# Patient Record
Sex: MALE | Race: WHITE | NOT HISPANIC OR LATINO | URBAN - METROPOLITAN AREA
[De-identification: names, ages, dates, MRNs, and addresses within clinical notes are randomized per-mention and may not be internally consistent; named-entity substitution may affect disease eponyms.]

---

## 2019-02-16 ENCOUNTER — INPATIENT (INPATIENT)
Facility: HOSPITAL | Age: 84
LOS: 3 days | Discharge: HOME | DRG: 872 | End: 2019-02-20
Attending: INTERNAL MEDICINE | Admitting: INTERNAL MEDICINE
Payer: MEDICARE

## 2019-02-16 VITALS
HEART RATE: 95 BPM | RESPIRATION RATE: 14 BRPM | SYSTOLIC BLOOD PRESSURE: 94 MMHG | DIASTOLIC BLOOD PRESSURE: 60 MMHG | WEIGHT: 149.91 LBS | TEMPERATURE: 99 F | OXYGEN SATURATION: 97 %

## 2019-02-16 DIAGNOSIS — M10.9 GOUT, UNSPECIFIED: ICD-10-CM

## 2019-02-16 DIAGNOSIS — N39.0 URINARY TRACT INFECTION, SITE NOT SPECIFIED: ICD-10-CM

## 2019-02-16 DIAGNOSIS — M79.2 NEURALGIA AND NEURITIS, UNSPECIFIED: ICD-10-CM

## 2019-02-16 DIAGNOSIS — N17.9 ACUTE KIDNEY FAILURE, UNSPECIFIED: ICD-10-CM

## 2019-02-16 DIAGNOSIS — N40.0 BENIGN PROSTATIC HYPERPLASIA WITHOUT LOWER URINARY TRACT SYMPTOMS: ICD-10-CM

## 2019-02-16 DIAGNOSIS — R26.81 UNSTEADINESS ON FEET: ICD-10-CM

## 2019-02-16 DIAGNOSIS — Z90.49 ACQUIRED ABSENCE OF OTHER SPECIFIED PARTS OF DIGESTIVE TRACT: Chronic | ICD-10-CM

## 2019-02-16 DIAGNOSIS — I48.91 UNSPECIFIED ATRIAL FIBRILLATION: ICD-10-CM

## 2019-02-16 DIAGNOSIS — Z29.9 ENCOUNTER FOR PROPHYLACTIC MEASURES, UNSPECIFIED: ICD-10-CM

## 2019-02-16 DIAGNOSIS — A41.9 SEPSIS, UNSPECIFIED ORGANISM: ICD-10-CM

## 2019-02-16 DIAGNOSIS — I10 ESSENTIAL (PRIMARY) HYPERTENSION: ICD-10-CM

## 2019-02-16 DIAGNOSIS — R53.1 WEAKNESS: ICD-10-CM

## 2019-02-16 LAB
ALBUMIN SERPL ELPH-MCNC: 2.9 G/DL — LOW (ref 3.3–5)
ALP SERPL-CCNC: 56 U/L — SIGNIFICANT CHANGE UP (ref 40–120)
ALT FLD-CCNC: 44 U/L — SIGNIFICANT CHANGE UP (ref 12–78)
ANION GAP SERPL CALC-SCNC: 12 MMOL/L — SIGNIFICANT CHANGE UP (ref 5–17)
APPEARANCE UR: ABNORMAL
APTT BLD: 38 SEC — HIGH (ref 27.5–36.3)
AST SERPL-CCNC: 57 U/L — HIGH (ref 15–37)
BACTERIA # UR AUTO: ABNORMAL
BASOPHILS # BLD AUTO: 0.06 K/UL — SIGNIFICANT CHANGE UP (ref 0–0.2)
BASOPHILS NFR BLD AUTO: 0.4 % — SIGNIFICANT CHANGE UP (ref 0–2)
BILIRUB SERPL-MCNC: 0.9 MG/DL — SIGNIFICANT CHANGE UP (ref 0.2–1.2)
BILIRUB UR-MCNC: ABNORMAL
BUN SERPL-MCNC: 35 MG/DL — HIGH (ref 7–23)
CALCIUM SERPL-MCNC: 8.6 MG/DL — SIGNIFICANT CHANGE UP (ref 8.5–10.1)
CHLORIDE SERPL-SCNC: 107 MMOL/L — SIGNIFICANT CHANGE UP (ref 96–108)
CK SERPL-CCNC: 134 U/L — SIGNIFICANT CHANGE UP (ref 26–308)
CO2 SERPL-SCNC: 26 MMOL/L — SIGNIFICANT CHANGE UP (ref 22–31)
COLOR SPEC: YELLOW — SIGNIFICANT CHANGE UP
CREAT SERPL-MCNC: 1.7 MG/DL — HIGH (ref 0.5–1.3)
DIFF PNL FLD: ABNORMAL
DIGOXIN SERPL-MCNC: 0.5 NG/ML — LOW (ref 0.8–2)
EOSINOPHIL # BLD AUTO: 0.02 K/UL — SIGNIFICANT CHANGE UP (ref 0–0.5)
EOSINOPHIL NFR BLD AUTO: 0.1 % — SIGNIFICANT CHANGE UP (ref 0–6)
EPI CELLS # UR: SIGNIFICANT CHANGE UP
FLU A RESULT: SIGNIFICANT CHANGE UP
FLU A RESULT: SIGNIFICANT CHANGE UP
FLUAV AG NPH QL: SIGNIFICANT CHANGE UP
FLUBV AG NPH QL: SIGNIFICANT CHANGE UP
GLUCOSE SERPL-MCNC: 163 MG/DL — HIGH (ref 70–99)
GLUCOSE UR QL: NEGATIVE — SIGNIFICANT CHANGE UP
HCT VFR BLD CALC: 45 % — SIGNIFICANT CHANGE UP (ref 39–50)
HGB BLD-MCNC: 14.8 G/DL — SIGNIFICANT CHANGE UP (ref 13–17)
IMM GRANULOCYTES NFR BLD AUTO: 1.4 % — SIGNIFICANT CHANGE UP (ref 0–1.5)
INR BLD: 3.85 RATIO — HIGH (ref 0.88–1.16)
KETONES UR-MCNC: ABNORMAL
LACTATE SERPL-SCNC: 2.8 MMOL/L — HIGH (ref 0.7–2)
LACTATE SERPL-SCNC: 3.1 MMOL/L — HIGH (ref 0.7–2)
LACTATE SERPL-SCNC: 4.6 MMOL/L — CRITICAL HIGH (ref 0.7–2)
LEUKOCYTE ESTERASE UR-ACNC: ABNORMAL
LYMPHOCYTES # BLD AUTO: 1.34 K/UL — SIGNIFICANT CHANGE UP (ref 1–3.3)
LYMPHOCYTES # BLD AUTO: 7.9 % — LOW (ref 13–44)
MCHC RBC-ENTMCNC: 32.5 PG — SIGNIFICANT CHANGE UP (ref 27–34)
MCHC RBC-ENTMCNC: 32.9 GM/DL — SIGNIFICANT CHANGE UP (ref 32–36)
MCV RBC AUTO: 98.9 FL — SIGNIFICANT CHANGE UP (ref 80–100)
MONOCYTES # BLD AUTO: 1.36 K/UL — HIGH (ref 0–0.9)
MONOCYTES NFR BLD AUTO: 8 % — SIGNIFICANT CHANGE UP (ref 2–14)
NEUTROPHILS # BLD AUTO: 13.94 K/UL — HIGH (ref 1.8–7.4)
NEUTROPHILS NFR BLD AUTO: 82.2 % — HIGH (ref 43–77)
NITRITE UR-MCNC: NEGATIVE — SIGNIFICANT CHANGE UP
NRBC # BLD: 0 /100 WBCS — SIGNIFICANT CHANGE UP (ref 0–0)
PH UR: 5 — SIGNIFICANT CHANGE UP (ref 5–8)
PLATELET # BLD AUTO: 160 K/UL — SIGNIFICANT CHANGE UP (ref 150–400)
POTASSIUM SERPL-MCNC: 5.7 MMOL/L — HIGH (ref 3.5–5.3)
POTASSIUM SERPL-SCNC: 5.7 MMOL/L — HIGH (ref 3.5–5.3)
PROT SERPL-MCNC: 6.2 G/DL — SIGNIFICANT CHANGE UP (ref 6–8.3)
PROT UR-MCNC: 150 MG/DL
PROTHROM AB SERPL-ACNC: 45.3 SEC — HIGH (ref 10–12.9)
RBC # BLD: 4.55 M/UL — SIGNIFICANT CHANGE UP (ref 4.2–5.8)
RBC # FLD: 14.6 % — HIGH (ref 10.3–14.5)
RBC CASTS # UR COMP ASSIST: ABNORMAL /HPF (ref 0–4)
RSV RESULT: SIGNIFICANT CHANGE UP
RSV RNA RESP QL NAA+PROBE: SIGNIFICANT CHANGE UP
SODIUM SERPL-SCNC: 145 MMOL/L — SIGNIFICANT CHANGE UP (ref 135–145)
SP GR SPEC: 1.02 — SIGNIFICANT CHANGE UP (ref 1.01–1.02)
TROPONIN I SERPL-MCNC: 0.02 NG/ML — SIGNIFICANT CHANGE UP (ref 0.01–0.04)
UROBILINOGEN FLD QL: NEGATIVE — SIGNIFICANT CHANGE UP
WBC # BLD: 16.96 K/UL — HIGH (ref 3.8–10.5)
WBC # FLD AUTO: 16.96 K/UL — HIGH (ref 3.8–10.5)
WBC UR QL: ABNORMAL

## 2019-02-16 PROCEDURE — 72125 CT NECK SPINE W/O DYE: CPT | Mod: 26

## 2019-02-16 PROCEDURE — 70450 CT HEAD/BRAIN W/O DYE: CPT | Mod: 26

## 2019-02-16 PROCEDURE — 72170 X-RAY EXAM OF PELVIS: CPT | Mod: 26,59

## 2019-02-16 PROCEDURE — 99223 1ST HOSP IP/OBS HIGH 75: CPT

## 2019-02-16 PROCEDURE — 73502 X-RAY EXAM HIP UNI 2-3 VIEWS: CPT | Mod: 26,RT

## 2019-02-16 PROCEDURE — 76770 US EXAM ABDO BACK WALL COMP: CPT | Mod: 26

## 2019-02-16 PROCEDURE — 73552 X-RAY EXAM OF FEMUR 2/>: CPT | Mod: 26,RT

## 2019-02-16 PROCEDURE — 99285 EMERGENCY DEPT VISIT HI MDM: CPT

## 2019-02-16 PROCEDURE — 71045 X-RAY EXAM CHEST 1 VIEW: CPT | Mod: 26

## 2019-02-16 PROCEDURE — 99223 1ST HOSP IP/OBS HIGH 75: CPT | Mod: AI,GC

## 2019-02-16 PROCEDURE — 76775 US EXAM ABDO BACK WALL LIM: CPT | Mod: 26

## 2019-02-16 RX ORDER — CYCLOSPORINE 0.5 MG/ML
1 EMULSION OPHTHALMIC
Qty: 0 | Refills: 0 | COMMUNITY

## 2019-02-16 RX ORDER — FINASTERIDE 5 MG/1
5 TABLET, FILM COATED ORAL DAILY
Qty: 0 | Refills: 0 | Status: DISCONTINUED | OUTPATIENT
Start: 2019-02-16 | End: 2019-02-20

## 2019-02-16 RX ORDER — FINASTERIDE 5 MG/1
1 TABLET, FILM COATED ORAL
Qty: 0 | Refills: 0 | COMMUNITY

## 2019-02-16 RX ORDER — ACETAMINOPHEN 500 MG
650 TABLET ORAL EVERY 6 HOURS
Qty: 0 | Refills: 0 | Status: DISCONTINUED | OUTPATIENT
Start: 2019-02-16 | End: 2019-02-20

## 2019-02-16 RX ORDER — COLCHICINE 0.6 MG
0.6 TABLET ORAL DAILY
Qty: 0 | Refills: 0 | Status: DISCONTINUED | OUTPATIENT
Start: 2019-02-16 | End: 2019-02-20

## 2019-02-16 RX ORDER — SODIUM CHLORIDE 9 MG/ML
1000 INJECTION INTRAMUSCULAR; INTRAVENOUS; SUBCUTANEOUS
Qty: 0 | Refills: 0 | Status: DISCONTINUED | OUTPATIENT
Start: 2019-02-16 | End: 2019-02-17

## 2019-02-16 RX ORDER — METOPROLOL TARTRATE 50 MG
0 TABLET ORAL
Qty: 0 | Refills: 0 | COMMUNITY

## 2019-02-16 RX ORDER — COLCHICINE 0.6 MG
1 TABLET ORAL
Qty: 0 | Refills: 0 | COMMUNITY

## 2019-02-16 RX ORDER — CEFTRIAXONE 500 MG/1
1 INJECTION, POWDER, FOR SOLUTION INTRAMUSCULAR; INTRAVENOUS ONCE
Qty: 0 | Refills: 0 | Status: COMPLETED | OUTPATIENT
Start: 2019-02-16 | End: 2019-02-16

## 2019-02-16 RX ORDER — SODIUM CHLORIDE 9 MG/ML
500 INJECTION INTRAMUSCULAR; INTRAVENOUS; SUBCUTANEOUS ONCE
Qty: 0 | Refills: 0 | Status: COMPLETED | OUTPATIENT
Start: 2019-02-16 | End: 2019-02-16

## 2019-02-16 RX ORDER — CEFTRIAXONE 500 MG/1
INJECTION, POWDER, FOR SOLUTION INTRAMUSCULAR; INTRAVENOUS
Qty: 0 | Refills: 0 | Status: DISCONTINUED | OUTPATIENT
Start: 2019-02-16 | End: 2019-02-18

## 2019-02-16 RX ORDER — DIGOXIN 250 MCG
1 TABLET ORAL
Qty: 0 | Refills: 0 | COMMUNITY

## 2019-02-16 RX ORDER — ALFUZOSIN HYDROCHLORIDE 10 MG/1
10 TABLET, EXTENDED RELEASE ORAL AT BEDTIME
Qty: 0 | Refills: 0 | Status: DISCONTINUED | OUTPATIENT
Start: 2019-02-16 | End: 2019-02-20

## 2019-02-16 RX ORDER — METOPROLOL TARTRATE 50 MG
25 TABLET ORAL DAILY
Qty: 0 | Refills: 0 | Status: DISCONTINUED | OUTPATIENT
Start: 2019-02-16 | End: 2019-02-17

## 2019-02-16 RX ORDER — PIPERACILLIN AND TAZOBACTAM 4; .5 G/20ML; G/20ML
3.38 INJECTION, POWDER, LYOPHILIZED, FOR SOLUTION INTRAVENOUS ONCE
Qty: 0 | Refills: 0 | Status: COMPLETED | OUTPATIENT
Start: 2019-02-16 | End: 2019-02-16

## 2019-02-16 RX ORDER — WARFARIN SODIUM 2.5 MG/1
0 TABLET ORAL
Qty: 0 | Refills: 0 | COMMUNITY

## 2019-02-16 RX ORDER — ALFUZOSIN HYDROCHLORIDE 10 MG/1
1 TABLET, EXTENDED RELEASE ORAL
Qty: 0 | Refills: 0 | COMMUNITY

## 2019-02-16 RX ORDER — WARFARIN SODIUM 2.5 MG/1
1 TABLET ORAL
Qty: 0 | Refills: 0 | COMMUNITY

## 2019-02-16 RX ORDER — DIGOXIN 250 MCG
0.12 TABLET ORAL DAILY
Qty: 0 | Refills: 0 | Status: DISCONTINUED | OUTPATIENT
Start: 2019-02-16 | End: 2019-02-20

## 2019-02-16 RX ORDER — SODIUM CHLORIDE 9 MG/ML
1000 INJECTION INTRAMUSCULAR; INTRAVENOUS; SUBCUTANEOUS ONCE
Qty: 0 | Refills: 0 | Status: COMPLETED | OUTPATIENT
Start: 2019-02-16 | End: 2019-02-16

## 2019-02-16 RX ORDER — CEFTRIAXONE 500 MG/1
1 INJECTION, POWDER, FOR SOLUTION INTRAMUSCULAR; INTRAVENOUS EVERY 24 HOURS
Qty: 0 | Refills: 0 | Status: DISCONTINUED | OUTPATIENT
Start: 2019-02-17 | End: 2019-02-18

## 2019-02-16 RX ORDER — GABAPENTIN 400 MG/1
300 CAPSULE ORAL
Qty: 0 | Refills: 0 | Status: DISCONTINUED | OUTPATIENT
Start: 2019-02-16 | End: 2019-02-20

## 2019-02-16 RX ORDER — GABAPENTIN 400 MG/1
1 CAPSULE ORAL
Qty: 0 | Refills: 0 | COMMUNITY

## 2019-02-16 RX ORDER — DIGOXIN 250 MCG
0 TABLET ORAL
Qty: 0 | Refills: 0 | COMMUNITY

## 2019-02-16 RX ORDER — SODIUM POLYSTYRENE SULFONATE 4.1 MEQ/G
15 POWDER, FOR SUSPENSION ORAL ONCE
Qty: 0 | Refills: 0 | Status: COMPLETED | OUTPATIENT
Start: 2019-02-16 | End: 2019-02-16

## 2019-02-16 RX ADMIN — CEFTRIAXONE 100 GRAM(S): 500 INJECTION, POWDER, FOR SOLUTION INTRAMUSCULAR; INTRAVENOUS at 18:45

## 2019-02-16 RX ADMIN — SODIUM CHLORIDE 500 MILLILITER(S): 9 INJECTION INTRAMUSCULAR; INTRAVENOUS; SUBCUTANEOUS at 17:05

## 2019-02-16 RX ADMIN — SODIUM CHLORIDE 1000 MILLILITER(S): 9 INJECTION INTRAMUSCULAR; INTRAVENOUS; SUBCUTANEOUS at 16:05

## 2019-02-16 RX ADMIN — SODIUM CHLORIDE 1000 MILLILITER(S): 9 INJECTION INTRAMUSCULAR; INTRAVENOUS; SUBCUTANEOUS at 17:05

## 2019-02-16 RX ADMIN — PIPERACILLIN AND TAZOBACTAM 200 GRAM(S): 4; .5 INJECTION, POWDER, LYOPHILIZED, FOR SOLUTION INTRAVENOUS at 17:21

## 2019-02-16 RX ADMIN — SODIUM CHLORIDE 500 MILLILITER(S): 9 INJECTION INTRAMUSCULAR; INTRAVENOUS; SUBCUTANEOUS at 16:05

## 2019-02-16 RX ADMIN — SODIUM POLYSTYRENE SULFONATE 15 GRAM(S): 4.1 POWDER, FOR SUSPENSION ORAL at 20:21

## 2019-02-16 RX ADMIN — SODIUM CHLORIDE 100 MILLILITER(S): 9 INJECTION INTRAMUSCULAR; INTRAVENOUS; SUBCUTANEOUS at 20:20

## 2019-02-16 RX ADMIN — SODIUM CHLORIDE 1000 MILLILITER(S): 9 INJECTION INTRAMUSCULAR; INTRAVENOUS; SUBCUTANEOUS at 14:46

## 2019-02-16 RX ADMIN — SODIUM CHLORIDE 1000 MILLILITER(S): 9 INJECTION INTRAMUSCULAR; INTRAVENOUS; SUBCUTANEOUS at 13:46

## 2019-02-16 NOTE — H&P ADULT - ATTENDING COMMENTS
pt seen and examine today see above plan -89 yo m pmx hx of Afib on coumadin, HTN, HLD, BPH, neuropathy presents to the ED s/p mechanical this morning. Admitted for sepsis  sec to uti,  inability to ambulate   sepsis poa  hypotension sec to uti  - iv fluid boluses given  , ns  cc 100  hr , iv abx Rocephin , ua , ucult , blood cult  .    alex possible prerenal sec to dehydration , iv fluid continuous  renal sono , bmp in am  .   hyperkalemia   Kayexalate  po  / fu bmp in am .  chr afib    on digoxin , bb     fu  level if its high hold  digoxin  ,  suprathep inr   hold coumadin  resume once inr less than 3  . pt family bedside / full code.

## 2019-02-16 NOTE — ED ADULT NURSE NOTE - OBJECTIVE STATEMENT
89 y/o male presents to the ed s/p fall at home. states was getting out of bed and felt very weak. he hit his head is c/o right knee, right hip pain. abrasion to head. he denies nausea vomiting fever chills chest pain sob headache abdominal pain urinary problems. states he feels to weak to walk

## 2019-02-16 NOTE — H&P ADULT - HISTORY OF PRESENT ILLNESS
89 yo m pmx hx of Afib on coumadin, HTN, HLD, BPH, neuropathy presents to the ED s/p mechanical this morning.  Pt reports that this morning while trying to get out of bed, he felt      with fall while he is trying to get OOB. He also reports history of frequent falls over the last 6 months. 89 yo m pmx hx of Afib on coumadin, HTN, HLD, BPH, neuropathy presents to the ED s/p mechanical this morning.  Pt reports that this morning while trying to get out of bed, he felt generalized weakness and was unable to prop himself up with his legs/arms and fell off the side of his bed, he hit his head but denies LOC.  Hx of frequent falls over the last 6 months and inbalance for many years.  Reports new right thigh pain that started 1 week ago, that is tender to palpation. Denies cp, sob, abdominal pain, dysuria, headaches, changes in vision    In the ed, vitals: bp 94/60, afebrile, HR 95, 02 97% on RA. Labs: wbc 16.96, INR 3.85, lactate 4.6 -> 2.8 s/p 2.5 L ns, potassium 5.7, tropx neg, CT head/spine neg, xrays neg for fracture. Given 2.5 L ns, zosyn.

## 2019-02-16 NOTE — H&P ADULT - PROBLEM SELECTOR PLAN 10
IMPROVE VTE Individual Risk Assessment        RISK                                                          Points  [  ] Previous VTE                                                3  [  ] Thrombophilia                                             2  [  ] Lower limb paralysis                                   2        (unable to hold up >15 seconds)    [  ] Current Cancer                                             2         (within 6 months)  [  ] Immobilization > 24 hrs                              1  [  ] ICU/CCU stay > 24 hours                             1  [ x ] Age > 60                                                         1  IMPROVE VTE Score: 1  DVT: on coumadin

## 2019-02-16 NOTE — ED PROVIDER NOTE - CHPI ED SYMPTOMS NEG
no cough/no back pain/no chills no back pain/no vomiting/no shortness of breath/no cough/no nausea/no syncope/no chills

## 2019-02-16 NOTE — H&P ADULT - PROBLEM SELECTOR PLAN 3
With generalized weakness, chronic, likely exacerbated in setting of uti and dehydration  - Ambulate/OOB with assistance  - fall protocol   - PT eval

## 2019-02-16 NOTE — ED PROVIDER NOTE - OBJECTIVE STATEMENT
89 yo M p/w was feeling weakness since last night. Pt was in bed, tried to get up and fell to ground, states bl legs weak. Pt hit head. no loc. No HA/n/v/dizzy. no neck/ back pain. Pt co mild R leg tightness. Pt states unable to walk due to gen weakness, not due to pain. no fever/chills. no cp/sob/palp. no cough/sputum. no abd pain. no n/.v/d. No dysuria / hematuria. no rash. no ha / neck pain. no other inj or co.

## 2019-02-16 NOTE — H&P ADULT - PROBLEM SELECTOR PLAN 5
Chronic, rate controlled, ekg reviewed, hyperkalemic on presentation, INR supratherapeutic  - continue dig, will check level  - hold warfarin  - continue metoprolol   - remote tele Chronic, rate controlled, ekg reviewed, hyperkalemic on presentation  / Kayexalate one dose   given  , INR supratherapeutic    - continue digoxin , will check level today   - hold warfarin  - continue metoprolol   - remote tele

## 2019-02-16 NOTE — H&P ADULT - PROBLEM SELECTOR PLAN 6
Hypotensive on presentation, improved s/p ns bolus  - continue metoprolol, alfuzosin  - monitor hemodynamics

## 2019-02-16 NOTE — H&P ADULT - NSHPREVIEWOFSYSTEMS_GEN_ALL_CORE
Constitutional: reports generalized muscle weakness, denies fever, chills, diaphoresis   HEENT: denies blurry vision, difficulty hearing  Respiratory: denies SOB, SNOWDEN, cough, sputum production, wheezing, hemoptysis  Cardiovascular: denies CP, palpitations, edema  Gastrointestinal: denies nausea, vomiting, diarrhea, constipation, abdominal pain, melena, hematochezia   Genitourinary: denies dysuria, frequency, urgency, hematuria   Skin/Breast: denies rash, itching  Musculoskeletal: denies myalgias, joint swelling, muscle weakness  Neurologic: reports mild dizziness, denies headache, paresthesias, numbness/tingling  Psychiatric: denies feeling anxious, depressed, suicidal, homicidal thoughts  ROS negative except as noted above Constitutional: reports generalized muscle weakness, denies fever, chills, diaphoresis   HEENT: denies blurry vision, difficulty hearing  Respiratory: denies SOB, SNOWDEN, cough, sputum production, wheezing, hemoptysis  Cardiovascular: denies CP, palpitations, edema  Gastrointestinal: denies nausea, vomiting, diarrhea, constipation, abdominal pain, melena, hematochezia   Genitourinary: denies dysuria, frequency, urgency, hematuria   Skin  denies rash, itching  Musculoskeletal: denies myalgias, joint swelling, muscle weakness  Neurologic:  mild dizziness, denies headache, paresthesias, numbness/tingling    ROS negative except as noted above

## 2019-02-16 NOTE — H&P ADULT - PROBLEM SELECTOR PLAN 4
Likely prerenal, unknown baseline renal function  - s/p 2.5 ns bolus, no need for additional ivf  - encourage PO fluid intake  - will trend renal indices Likely prerenal, unknown baseline renal function  - s/p 2.5 ns bolus, iv fluid ns 100 hr  - encourage PO fluid intake  - will trend renal indices  , renal sono

## 2019-02-16 NOTE — ED PROVIDER NOTE - ENMT, MLM
Airway patent, Nasal mucosa clear. Mouth with normal mucosa. Throat has no vesicles, no oropharyngeal exudates and uvula is midline. Pos forehead abrasion.

## 2019-02-16 NOTE — ED ADULT NURSE REASSESSMENT NOTE - NS ED NURSE REASSESS COMMENT FT1
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

## 2019-02-16 NOTE — H&P ADULT - ASSESSMENT
89 yo m pmx hx of Afib on coumadin, HTN, HLD, BPH, neuropathy presents to the ED s/p mechanical this morning. Admitted for sepsis, uti, inability to ambulate 89 yo m pmx hx of Afib on coumadin, HTN, HLD, BPH, neuropathy presents to the ED s/p mechanical this morning. Admitted for sepsis  sec to uti, inability to ambulate

## 2019-02-16 NOTE — ED PROVIDER NOTE - CARE PLAN
Principal Discharge DX:	Weakness  Secondary Diagnosis:	Leukocytosis, unspecified type Principal Discharge DX:	Weakness  Secondary Diagnosis:	Leukocytosis, unspecified type  Secondary Diagnosis:	Head injuries, initial encounter

## 2019-02-16 NOTE — CONSULT NOTE ADULT - SUBJECTIVE AND OBJECTIVE BOX
[facility] - Steven Damon, Jammie, Ed, Zana, Alex Kraft  Office Number: [phone]    Initial Consult Note    CHIEF COMPLAINT: Patient is a 89y old  Male who presents with a chief complaint of     HPI:  89 yo M p/w was feeling weakness since last night. Pt was in bed, tried to get up and fell to ground, states bl legs weak. Pt hit head. no loc. No HA/n/v/dizzy. no neck/ back pain. Pt co mild R leg tightness. Pt states unable to walk due to gen weakness, not due to pain. no fever/chills. no cp/sob/palp. no cough/sputum. no abd pain. no n/.v/d. No dysuria / hematuria. no rash. no ha / neck pain. no other inj or co. Pt reports history of frequent falls over the last 6 months.      Called for evaluation of A- fib.  PAST MEDICAL & SURGICAL HISTORY:  Afib  Hypertension  Hyperlipidemia  Status post cholecystectomy      SOCIAL HISTORY:  No tobacco, ethanol, or drug abuse.    FAMILY HISTORY:    No family history of acute MI or sudden cardiac death.    MEDICATIONS  (STANDING):  piperacillin/tazobactam IVPB. 3.375 Gram(s) IV Intermittent once  sodium chloride 0.9% Bolus 1000 milliLiter(s) IV Bolus once  sodium chloride 0.9% Bolus 500 milliLiter(s) IV Bolus once    MEDICATIONS  (PRN):      Allergies    No Known Allergies    Intolerances        REVIEW OF SYSTEMS:    CONSTITUTIONAL: + weakness, no fevers or chills  EYES/ENT: No visual changes;  No vertigo or throat pain   NECK:  no pain or stiffness  RESPIRATORY: No cough, wheezing, hemoptysis; No shortness of breath  CARDIOVASCULAR: No chest pain or palpitations  GASTROINTESTINAL: No abdominal pain. No nausea, vomiting, or hematemesis; No diarrhea or constipation. No melena or hematochezia.  GENITOURINARY: No dysuria, frequency or hematuria  NEUROLOGICAL: No numbness or weakness, Has c/o left hip pain  SKIN: No itching or rash  All other review of systems is negative unless indicated above    VITAL SIGNS:   Vital Signs Last 24 Hrs  T(C): 37.1 (16 Feb 2019 12:41), Max: 37.1 (16 Feb 2019 12:41)  T(F): 98.7 (16 Feb 2019 12:41), Max: 98.7 (16 Feb 2019 12:41)  HR: 95 (16 Feb 2019 12:41) (95 - 95)  BP: 94/60 (16 Feb 2019 12:41) (94/60 - 94/60)  BP(mean): --  RR: 14 (16 Feb 2019 12:41) (14 - 14)  SpO2: 97% (16 Feb 2019 12:41) (97% - 97%)    I&O's Summary      On Exam: A fib on the monitor    Constitutional: NAD, alert and oriented x 3  Lungs:  Non-labored, breath sounds are clear bilaterally, No wheezing, rales or rhonchi  Cardiovascular: RRR.  S1 and S2 positive.  No murmurs, rubs, gallops or clicks  Gastrointestinal: Bowel Sounds present, soft, nontender.   Lymph: trace bilateral edema . No cervical lymphadenopathy.  Neurological: Alert, no focal deficits  Skin: No rashes or ulcers   Psych:  Mood & affect appropriate.    LABS: All Labs Reviewed:                        14.8   16.96 )-----------( 160      ( 16 Feb 2019 13:38 )             45.0     16 Feb 2019 13:38    145    |  107    |  35     ----------------------------<  163    5.7     |  26     |  1.70     Ca    8.6        16 Feb 2019 13:38    TPro  6.2    /  Alb  2.9    /  TBili  0.9    /  DBili  x      /  AST  57     /  ALT  44     /  AlkPhos  56     16 Feb 2019 13:38    PT/INR - ( 16 Feb 2019 13:38 )   PT: 45.3 sec;   INR: 3.85 ratio         PTT - ( 16 Feb 2019 13:38 )  PTT:38.0 sec  CARDIAC MARKERS ( 16 Feb 2019 13:38 )  .024 ng/mL / x     / 134 U/L / x     / x          Blood Culture:         RADIOLOGY:EXAM:  CT BRAIN                            PROCEDURE DATE:  02/16/2019          INTERPRETATION:  CT BRAIN     Axial sections were obtained from base to vertex without contrast.    Clinical History: Fall head injury    Comparison: None    FINDINGS:    There is no mass, mass effect or midline shift. There are no intraaxial   or extraaxial fluid collections. There is no evidence for acute segmental   infarct.  Ventricular system and sulcal pattern are within normal limits   for the patient's age.    The visualized sinuses and mastoid air cells are unremarkable.    Bones and soft tissues are normal.     IMPRESSION: No acute findings.    JUAN GIFFORD M.D., ATTENDING RADIOLOGIST  This document has been electronically signed. Feb 16 2019  2:25PM        EKG: A FIB at [address] [facility] - Steven Damon, Jammie, Ed, Zana, Alex Kraft  Office Number: [phone]    Initial Consult Note    CHIEF COMPLAINT: Patient is a 89y old  Male who presents with a chief complaint of     HPI:  89 yo M p/w was feeling weakness since last night. Pt was in bed, tried to get up and fell to ground, states bl legs weak. Pt hit head. no loc. No HA/n/v/dizzy. no neck/ back pain. Pt co mild R leg tightness. Pt states unable to walk due to gen weakness, not due to pain. no fever/chills. no cp/sob/palp. no cough/sputum. no abd pain. no n/.v/d. No dysuria / hematuria. no rash. no ha / neck pain. no other inj or co. Pt reports history of frequent falls over the last 6 months.      Called for evaluation of A- fib.  PAST MEDICAL & SURGICAL HISTORY:  Afib  Hypertension  Hyperlipidemia  Status post cholecystectomy      SOCIAL HISTORY:  No tobacco, ethanol, or drug abuse.    FAMILY HISTORY:    No family history of acute MI or sudden cardiac death.    MEDICATIONS  (STANDING):  piperacillin/tazobactam IVPB. 3.375 Gram(s) IV Intermittent once  sodium chloride 0.9% Bolus 1000 milliLiter(s) IV Bolus once  sodium chloride 0.9% Bolus 500 milliLiter(s) IV Bolus once    MEDICATIONS  (PRN):      Allergies    No Known Allergies    Intolerances        REVIEW OF SYSTEMS:    CONSTITUTIONAL: + weakness, no fevers or chills  EYES/ENT: No visual changes;  No vertigo or throat pain   NECK:  no pain or stiffness  RESPIRATORY: No cough, wheezing, hemoptysis; No shortness of breath  CARDIOVASCULAR: No chest pain or palpitations  GASTROINTESTINAL: No abdominal pain. No nausea, vomiting, or hematemesis; No diarrhea or constipation. No melena or hematochezia.  GENITOURINARY: No dysuria, frequency or hematuria  NEUROLOGICAL: No numbness or weakness, Has c/o left hip pain  SKIN: No itching or rash  All other review of systems is negative unless indicated above    VITAL SIGNS:   Vital Signs Last 24 Hrs  T(C): 37.1 (16 Feb 2019 12:41), Max: 37.1 (16 Feb 2019 12:41)  T(F): 98.7 (16 Feb 2019 12:41), Max: 98.7 (16 Feb 2019 12:41)  HR: 95 (16 Feb 2019 12:41) (95 - 95)  BP: 94/60 (16 Feb 2019 12:41) (94/60 - 94/60)  BP(mean): --  RR: 14 (16 Feb 2019 12:41) (14 - 14)  SpO2: 97% (16 Feb 2019 12:41) (97% - 97%)    I&O's Summary      On Exam: A fib on the monitor    Constitutional: NAD, alert and oriented x 3  Lungs:  Non-labored, breath sounds are clear bilaterally, No wheezing, rales or rhonchi  Cardiovascular: irregular, S1 and S2 positive.  No murmurs, rubs, gallops or clicks  Gastrointestinal: Bowel Sounds present, soft, nontender.   Lymph: trace bilateral edema . No cervical lymphadenopathy.  Neurological: Alert, no focal deficits  Skin: No rashes or ulcers   Psych:  Mood & affect appropriate.    LABS: All Labs Reviewed:                        14.8   16.96 )-----------( 160      ( 16 Feb 2019 13:38 )             45.0     16 Feb 2019 13:38    145    |  107    |  35     ----------------------------<  163    5.7     |  26     |  1.70     Ca    8.6        16 Feb 2019 13:38    TPro  6.2    /  Alb  2.9    /  TBili  0.9    /  DBili  x      /  AST  57     /  ALT  44     /  AlkPhos  56     16 Feb 2019 13:38    PT/INR - ( 16 Feb 2019 13:38 )   PT: 45.3 sec;   INR: 3.85 ratio         PTT - ( 16 Feb 2019 13:38 )  PTT:38.0 sec  CARDIAC MARKERS ( 16 Feb 2019 13:38 )  .024 ng/mL / x     / 134 U/L / x     / x          Blood Culture:         RADIOLOGY:EXAM:  CT BRAIN                            PROCEDURE DATE:  02/16/2019          INTERPRETATION:  CT BRAIN     Axial sections were obtained from base to vertex without contrast.    Clinical History: Fall head injury    Comparison: None    FINDINGS:    There is no mass, mass effect or midline shift. There are no intraaxial   or extraaxial fluid collections. There is no evidence for acute segmental   infarct.  Ventricular system and sulcal pattern are within normal limits   for the patient's age.    The visualized sinuses and mastoid air cells are unremarkable.    Bones and soft tissues are normal.     IMPRESSION: No acute findings.    JUAN GIFFORD M.D., ATTENDING RADIOLOGIST  This document has been electronically signed. Feb 16 2019  2:25PM        EKG: A FIB at 110

## 2019-02-16 NOTE — H&P ADULT - PROBLEM SELECTOR PLAN 1
Admit to med/surg - unclear etiology, likely 2/2 uti from urinary retention with component of dehydration. Leukocytosis with elevated lactate and hypotensive on presentation. UA noted  - s/p 2.5 L ns bolus per sepsis protocol with good response, lactate trending downing, bp improved  - continue zosyn for now  - encourage PO fluids  - blood and urine cultures - possible sec to  2/2 uti from  with component of dehydration. Leukocytosis with elevated lactate and hypotensive on presentation. UA  +  - s/p 2.5 L ns bolus per sepsis protocol with good response, lactate trending downing, bp improved  - continue  iv abx Rocephin  for now  - encourage PO fluids  -  fu blood and urine cultures

## 2019-02-16 NOTE — CONSULT NOTE ADULT - ASSESSMENT
89 y/o male with past medical history of A fib on AC (coumadin), HTN, HLD presented to the ER with fall while he is trying to get OOB. He also reports history of frequent falls over the last 6 months.    Denies any chest pain, SOB, or palpitations, Dyspnea or orthopnea.  He has been following up with Dr. Zheng, cardiologist. He has been on BB, digoxin and  Coumadin   -Will monitor closely  - INR supra therapeutic. Hold coumadin until INR < 2  - Check digoxin level  -Continue BB  - Will do Echo to monitor structural defects  -Neuro eval   ID consult for leukocytosis  - Monitor and replete lytes, keep K>4, Mg>2.    Neyda Fernandes,  Cardiology 89 y/o male with past medical history of A fib on AC (coumadin), HTN, HLD presented to the ER with fall while he is trying to get OOB. He also reports history of frequent falls over the last 6 months.    Denies any chest pain, SOB, or palpitations, Dyspnea or orthopnea.  He has been following up with Dr. Zheng, cardiologist. He has been on BB, digoxin and  Coumadin     - will be admitted for inability to walk, weakness and leukocytosis  - INR currently supra therapeutic. Hold coumadin tonight, and restart tomorrow. Goal INR 2-3  - No sign of acute ischemia. Troponin is negative. It is unclear why his lactate is elevated. Can give gentle ivf.  - Check digoxin level  - Continue BB  - No active cardiac complaints, so I think an echocardiogram will not be high yield  - His elevated HRs were present in the setting of pain  - Recommend Neuro eval  - Monitor and replete lytes, keep K>4, Mg>2.   - Will follow with you   Neyda Fernandes,  Cardiology

## 2019-02-16 NOTE — H&P ADULT - NSHPPHYSICALEXAM_GEN_ALL_CORE
Vital Signs Last 24 Hrs  T(C): 36.4 (16 Feb 2019 17:00), Max: 37.1 (16 Feb 2019 12:41)  T(F): 97.6 (16 Feb 2019 17:00), Max: 98.7 (16 Feb 2019 12:41)  HR: 100 (16 Feb 2019 17:00) (95 - 100)  BP: 133/75 (16 Feb 2019 17:00) (94/60 - 133/75)  BP(mean): --  RR: 16 (16 Feb 2019 17:00) (14 - 16)  SpO2: 99% (16 Feb 2019 17:00) (97% - 99%)    Physical Exam:  General: NAD, frail elderly  HEENT: NCAT, PERRLA, EOMI bl, moist mucous membranes   Neck: Supple, nontender, no mass  Neurology: A&Ox3, nonfocal, CN II-XII grossly intact, sensation intact   Respiratory: CTA B/L, No W/R/R  CV: irregular, in afib, +S1/S2, no murmurs  Abdominal: Soft, NT, ND +BS  Extremities: No C/C/E, + 2 peripheral pulses  MSK: Normal ROM, no joint erythema or warmth, no joint swelling   Skin: warm, dry, normal color Vital Signs Last 24 Hrs  T(C): 36.4 (16 Feb 2019 17:00), Max: 37.1 (16 Feb 2019 12:41)  T(F): 97.6 (16 Feb 2019 17:00), Max: 98.7 (16 Feb 2019 12:41)  HR: 100 (16 Feb 2019 17:00) (95 - 100)  BP: 133/75 (16 Feb 2019 17:00) (94/60 - 133/75)  BP(mean): --  RR: 16 (16 Feb 2019 17:00) (14 - 16)  SpO2: 99% (16 Feb 2019 17:00) (97% - 99%)    Physical Exam:  General: NAD, frail elderly  HEENT: NCAT, PERRLA, EOMI bl, moist mucous membranes   Neck: Supple, nontender, no mass  Neurology: A&Ox3, nonfocal, CN II-XII grossly intact, sensation intact   Respiratory: CTA B/L, No Wheezing , no rale   CV: irregular, +S1/S2, no murmurs  no tachy   Abdominal: Soft, NT, ND +BS  Extremities: No C/C/E, + 2 peripheral pulses  MSK: Normal ROM, no joint erythema or warmth, no joint swelling   Skin: warm, dry, normal color

## 2019-02-17 DIAGNOSIS — D72.829 ELEVATED WHITE BLOOD CELL COUNT, UNSPECIFIED: ICD-10-CM

## 2019-02-17 DIAGNOSIS — R53.1 WEAKNESS: ICD-10-CM

## 2019-02-17 LAB
ANION GAP SERPL CALC-SCNC: 7 MMOL/L — SIGNIFICANT CHANGE UP (ref 5–17)
BASOPHILS # BLD AUTO: 0.02 K/UL — SIGNIFICANT CHANGE UP (ref 0–0.2)
BASOPHILS NFR BLD AUTO: 0.2 % — SIGNIFICANT CHANGE UP (ref 0–2)
BUN SERPL-MCNC: 23 MG/DL — SIGNIFICANT CHANGE UP (ref 7–23)
CALCIUM SERPL-MCNC: 7.5 MG/DL — LOW (ref 8.5–10.1)
CHLORIDE SERPL-SCNC: 110 MMOL/L — HIGH (ref 96–108)
CO2 SERPL-SCNC: 28 MMOL/L — SIGNIFICANT CHANGE UP (ref 22–31)
CREAT SERPL-MCNC: 1 MG/DL — SIGNIFICANT CHANGE UP (ref 0.5–1.3)
EOSINOPHIL # BLD AUTO: 0.15 K/UL — SIGNIFICANT CHANGE UP (ref 0–0.5)
EOSINOPHIL NFR BLD AUTO: 1.2 % — SIGNIFICANT CHANGE UP (ref 0–6)
GLUCOSE SERPL-MCNC: 97 MG/DL — SIGNIFICANT CHANGE UP (ref 70–99)
HCT VFR BLD CALC: 34 % — LOW (ref 39–50)
HGB BLD-MCNC: 11.3 G/DL — LOW (ref 13–17)
IMM GRANULOCYTES NFR BLD AUTO: 0.8 % — SIGNIFICANT CHANGE UP (ref 0–1.5)
INR BLD: 4.08 RATIO — HIGH (ref 0.88–1.16)
LACTATE SERPL-SCNC: 1.3 MMOL/L — SIGNIFICANT CHANGE UP (ref 0.7–2)
LYMPHOCYTES # BLD AUTO: 18.1 % — SIGNIFICANT CHANGE UP (ref 13–44)
LYMPHOCYTES # BLD AUTO: 2.35 K/UL — SIGNIFICANT CHANGE UP (ref 1–3.3)
MAGNESIUM SERPL-MCNC: 2 MG/DL — SIGNIFICANT CHANGE UP (ref 1.6–2.6)
MCHC RBC-ENTMCNC: 32.8 PG — SIGNIFICANT CHANGE UP (ref 27–34)
MCHC RBC-ENTMCNC: 33.2 GM/DL — SIGNIFICANT CHANGE UP (ref 32–36)
MCV RBC AUTO: 98.8 FL — SIGNIFICANT CHANGE UP (ref 80–100)
MONOCYTES # BLD AUTO: 1.05 K/UL — HIGH (ref 0–0.9)
MONOCYTES NFR BLD AUTO: 8.1 % — SIGNIFICANT CHANGE UP (ref 2–14)
NEUTROPHILS # BLD AUTO: 9.28 K/UL — HIGH (ref 1.8–7.4)
NEUTROPHILS NFR BLD AUTO: 71.6 % — SIGNIFICANT CHANGE UP (ref 43–77)
NRBC # BLD: 0 /100 WBCS — SIGNIFICANT CHANGE UP (ref 0–0)
PHOSPHATE SERPL-MCNC: 2.3 MG/DL — LOW (ref 2.5–4.5)
PLATELET # BLD AUTO: 112 K/UL — LOW (ref 150–400)
POTASSIUM SERPL-MCNC: 4 MMOL/L — SIGNIFICANT CHANGE UP (ref 3.5–5.3)
POTASSIUM SERPL-SCNC: 4 MMOL/L — SIGNIFICANT CHANGE UP (ref 3.5–5.3)
PROTHROM AB SERPL-ACNC: 48.6 SEC — HIGH (ref 10–12.9)
RBC # BLD: 3.44 M/UL — LOW (ref 4.2–5.8)
RBC # FLD: 14.7 % — HIGH (ref 10.3–14.5)
SODIUM SERPL-SCNC: 145 MMOL/L — SIGNIFICANT CHANGE UP (ref 135–145)
WBC # BLD: 12.95 K/UL — HIGH (ref 3.8–10.5)
WBC # FLD AUTO: 12.95 K/UL — HIGH (ref 3.8–10.5)

## 2019-02-17 PROCEDURE — 99233 SBSQ HOSP IP/OBS HIGH 50: CPT

## 2019-02-17 PROCEDURE — 99232 SBSQ HOSP IP/OBS MODERATE 35: CPT

## 2019-02-17 RX ORDER — SODIUM CHLORIDE 9 MG/ML
1000 INJECTION INTRAMUSCULAR; INTRAVENOUS; SUBCUTANEOUS ONCE
Qty: 0 | Refills: 0 | Status: COMPLETED | OUTPATIENT
Start: 2019-02-17 | End: 2019-02-17

## 2019-02-17 RX ORDER — METOPROLOL TARTRATE 50 MG
25 TABLET ORAL ONCE
Qty: 0 | Refills: 0 | Status: COMPLETED | OUTPATIENT
Start: 2019-02-17 | End: 2019-02-17

## 2019-02-17 RX ORDER — METOPROLOL TARTRATE 50 MG
25 TABLET ORAL
Qty: 0 | Refills: 0 | Status: DISCONTINUED | OUTPATIENT
Start: 2019-02-17 | End: 2019-02-20

## 2019-02-17 RX ADMIN — Medication 1 TABLET(S): at 11:41

## 2019-02-17 RX ADMIN — ALFUZOSIN HYDROCHLORIDE 10 MILLIGRAM(S): 10 TABLET, EXTENDED RELEASE ORAL at 21:17

## 2019-02-17 RX ADMIN — Medication 0.12 MILLIGRAM(S): at 05:09

## 2019-02-17 RX ADMIN — Medication 25 MILLIGRAM(S): at 11:44

## 2019-02-17 RX ADMIN — CEFTRIAXONE 100 GRAM(S): 500 INJECTION, POWDER, FOR SOLUTION INTRAMUSCULAR; INTRAVENOUS at 17:17

## 2019-02-17 RX ADMIN — Medication 25 MILLIGRAM(S): at 05:09

## 2019-02-17 RX ADMIN — GABAPENTIN 300 MILLIGRAM(S): 400 CAPSULE ORAL at 05:09

## 2019-02-17 RX ADMIN — SODIUM CHLORIDE 1000 MILLILITER(S): 9 INJECTION INTRAMUSCULAR; INTRAVENOUS; SUBCUTANEOUS at 00:41

## 2019-02-17 RX ADMIN — Medication 1 DROP(S): at 17:15

## 2019-02-17 RX ADMIN — FINASTERIDE 5 MILLIGRAM(S): 5 TABLET, FILM COATED ORAL at 11:41

## 2019-02-17 RX ADMIN — GABAPENTIN 300 MILLIGRAM(S): 400 CAPSULE ORAL at 17:15

## 2019-02-17 RX ADMIN — Medication 25 MILLIGRAM(S): at 17:15

## 2019-02-17 RX ADMIN — Medication 1 DROP(S): at 05:09

## 2019-02-17 NOTE — PROGRESS NOTE ADULT - SUBJECTIVE AND OBJECTIVE BOX
[facility] -- Steven Damon, Jammie, Ed, Swapnil Spann Savella  Office # [phone]      Follow Up:    Afib   Subjective/Observations:   No events overnight resting comfortably in bed.  No complaints of chest pain, dyspnea, or palpitations reported. No signs of orthopnea or PND. Complains of "numb legs"    REVIEW OF SYSTEMS: All other review of systems is negative unless indicated above    PAST MEDICAL & SURGICAL HISTORY:  Afib  Hypertension  Hyperlipidemia  Status post cholecystectomy      MEDICATIONS  (STANDING):  alfuzosin 10 milliGRAM(s) Oral at bedtime  artificial  tears Solution 1 Drop(s) Both EYES two times a day  cefTRIAXone   IVPB      cefTRIAXone   IVPB 1 Gram(s) IV Intermittent every 24 hours  digoxin     Tablet 0.125 milliGRAM(s) Oral daily  finasteride 5 milliGRAM(s) Oral daily  gabapentin 300 milliGRAM(s) Oral two times a day  metoprolol tartrate 25 milliGRAM(s) Oral two times a day  multivitamin 1 Tablet(s) Oral daily    MEDICATIONS  (PRN):  acetaminophen   Tablet .. 650 milliGRAM(s) Oral every 6 hours PRN Mild Pain (1 - 3)  colchicine 0.6 milliGRAM(s) Oral daily PRN gout symptoms      Allergies    No Known Allergies    Intolerances        Vital Signs Last 24 Hrs  T(C): 36.9 (17 Feb 2019 13:07), Max: 36.9 (17 Feb 2019 13:07)  T(F): 98.5 (17 Feb 2019 13:07), Max: 98.5 (17 Feb 2019 13:07)  HR: 87 (17 Feb 2019 13:07) (86 - 122)  BP: 100/63 (17 Feb 2019 13:07) (95/65 - 133/75)  BP(mean): --  RR: 18 (17 Feb 2019 13:07) (16 - 18)  SpO2: 95% (17 Feb 2019 13:07) (95% - 100%)    I&O's Summary    16 Feb 2019 07:01  -  17 Feb 2019 07:00  --------------------------------------------------------  IN: 1825 mL / OUT: 75 mL / NET: 1750 mL          PHYSICAL EXAM:  TELE: Afib No events on tele overnight   Constitutional: NAD, awake and alert, well-developed  HEENT: Moist Mucous Membranes, Anicteric  Pulmonary: Non-labored, breath sounds are clear bilaterally, No wheezing, crackles or rhonchi  Cardiovascular: Irregular, S1 and S2 nl, No murmurs, rubs, gallops or clicks  Gastrointestinal: Bowel Sounds present, soft, nontender.   Lymph: No lymphadenopathy. No peripheral edema.  Skin: No visible rashes or ulcers.  Psych:  Mood & affect appropriate    LABS: All Labs Reviewed:                        11.3   12.95 )-----------( 112      ( 17 Feb 2019 09:28 )             34.0                         14.8   16.96 )-----------( 160      ( 16 Feb 2019 13:38 )             45.0     17 Feb 2019 09:28    145    |  110    |  23     ----------------------------<  97     4.0     |  28     |  1.00   16 Feb 2019 13:38    145    |  107    |  35     ----------------------------<  163    5.7     |  26     |  1.70     Ca    7.5        17 Feb 2019 09:28  Ca    8.6        16 Feb 2019 13:38  Phos  2.3       17 Feb 2019 09:28  Mg     2.0       17 Feb 2019 09:28    TPro  6.2    /  Alb  2.9    /  TBili  0.9    /  DBili  x      /  AST  57     /  ALT  44     /  AlkPhos  56     16 Feb 2019 13:38    PT/INR - ( 17 Feb 2019 09:28 )   PT: 48.6 sec;   INR: 4.08 ratio         PTT - ( 16 Feb 2019 13:38 )  PTT:38.0 sec  CARDIAC MARKERS ( 16 Feb 2019 13:38 )  .024 ng/mL / x     / 134 U/L / x     / x      RADIOLOGY:EXAM:  CT BRAIN                            PROCEDURE DATE:  02/16/2019          INTERPRETATION:  CT BRAIN     Axial sections were obtained from base to vertex without contrast.    Clinical History: Fall head injury    Comparison: None    FINDINGS:    There is no mass, mass effect or midline shift. There are no intraaxial   or extraaxial fluid collections. There is no evidence for acute segmental   infarct.  Ventricular system and sulcal pattern are within normal limits   for the patient's age.    The visualized sinuses and mastoid air cells are unremarkable.    Bones and soft tissues are normal.     IMPRESSION: No acute findings.    JUAN GIFFORD M.D., ATTENDING RADIOLOGIST  This document has been electronically signed. Feb 16 2019  2:25PM             [facility] Tor[facility] [place]   Cardiology

## 2019-02-17 NOTE — PROGRESS NOTE ADULT - ASSESSMENT
89 y/o male with past medical history of A fib on AC (coumadin), HTN, HLD presented to the ER with fall while he is trying to get OOB. He also reports history of frequent falls over the last 6 months.    Denies any chest pain, SOB, or palpitations, Dyspnea or orthopnea.  He has been following up with Dr. Zheng, cardiologist. He has been on BB, digoxin and  Coumadin     - will be admitted for inability to walk, weakness and leukocytosis  - INR still supra therapeutic. Hold coumadin until INR 2-3  - No sign of acute ischemia. Troponin is negative. It is unclear why his lactate is elevated. Can give gentle ivf.  -CW digoxin  - Continue BB  - No active cardiac complaints, so I think an echocardiogram will not be high yield  - His elevated HRs were present in the setting of pain  - Recommend Neuro eval  - Monitor and replete lytes, keep K>4, Mg>2.   - Will follow with you   Maksim Toro ANP  Cardiology 89 y/o male with past medical history of A fib on AC (coumadin), HTN, HLD presented to the ER with fall while he is trying to get OOB. He also reports history of frequent falls over the last 6 months.    Denies any chest pain, SOB, or palpitations, Dyspnea or orthopnea.  He has been following up with Dr. Zheng, cardiologist. He has been on BB, digoxin and  Coumadin     - admitted for inability to walk, weakness and leukocytosis  - INR still supra therapeutic. Hold coumadin until INR near 3  - No sign of acute ischemia. Troponin is negative. It is unclear why his lactate is elevated. Can give gentle ivf.  - C/W digoxin  - Continue BB; I have changed it to lopressor 25 mg po bid.  - No active cardiac complaints, so I think an echocardiogram will not be high yield  - His elevated HRs were present in the setting of pain  - Monitor and replete lytes, keep K>4, Mg>2.   - Will follow with you   Maksim Toro ANP  Cardiology

## 2019-02-17 NOTE — PROGRESS NOTE ADULT - ASSESSMENT
89 yo m pmx hx of Afib on coumadin, HTN, HLD, BPH, neuropathy presents to the ED s/p mechanical this morning. Admitted for sepsis  sec to uti, inability to ambulate

## 2019-02-17 NOTE — CONSULT NOTE ADULT - PROBLEM SELECTOR RECOMMENDATION 4
per primary team.    Thank you for consulting us and involving us in the management of this most interesting and challenging case.     We will follow along in the care of this patient.

## 2019-02-17 NOTE — CONSULT NOTE ADULT - ASSESSMENT
89 yo m pmx hx of Afib on coumadin, HTN, HLD, BPH, neuropathy presents to the ED s/p mechanical fall with no localizing symptoms but noted leukocytosis and abn UA .

## 2019-02-17 NOTE — CONSULT NOTE ADULT - SUBJECTIVE AND OBJECTIVE BOX
HPI:  89 yo m pmx hx of Afib on coumadin, HTN, HLD, BPH, neuropathy presents to the ED s/p mechanical fall.  Pt reports that in the morning while trying to get out of bed, he felt generalized weakness and was unable to prop himself up with his legs/arms and fell off the side of his bed, he hit his head but denies LOC.  Hx of frequent falls over the last 6 months and inbalance for many years.  Reports new right thigh pain that started 1 week ago, that is tender to palpation. Denies cp, sob, abdominal pain, dysuria, headaches, changes in vision    In the ed, vitals: bp 94/60, afebrile, HR 95, 02 97% on RA. Labs: wbc 16.96, INR 3.85, lactate 4.6 -> 2.8 s/p 2.5 L ns, potassium 5.7, tropx neg, CT head/spine neg, xrays neg for fracture. Given 2.5 L ns, zosyn. (2019 17:03)      PAST MEDICAL & SURGICAL HISTORY:  Afib  Hypertension  Hyperlipidemia  Status post cholecystectomy      Antimicrobials  cefTRIAXone   IVPB      cefTRIAXone   IVPB 1 Gram(s) IV Intermittent every 24 hours      Immunological      Other  acetaminophen   Tablet .. 650 milliGRAM(s) Oral every 6 hours PRN  alfuzosin 10 milliGRAM(s) Oral at bedtime  artificial  tears Solution 1 Drop(s) Both EYES two times a day  colchicine 0.6 milliGRAM(s) Oral daily PRN  digoxin     Tablet 0.125 milliGRAM(s) Oral daily  finasteride 5 milliGRAM(s) Oral daily  gabapentin 300 milliGRAM(s) Oral two times a day  metoprolol tartrate 25 milliGRAM(s) Oral two times a day  multivitamin 1 Tablet(s) Oral daily      Allergies    No Known Allergies    Intolerances        SOCIAL HISTORY: no toxic habits reported    FAMILY HISTORY:  No pertinent family history in first degree relatives      ROS:    EYES:  Negative  blurry vision or double vision  GASTROINTESTINAL:  Negative for nausea, vomiting, diarrhea  -otherwise negative except for subjective    Vital Signs Last 24 Hrs  T(C): 36.9 (2019 13:07), Max: 36.9 (2019 13:07)  T(F): 98.5 (2019 13:07), Max: 98.5 (2019 13:07)  HR: 87 (2019 13:07) (86 - 122)  BP: 100/63 (2019 13:07) (95/65 - 133/75)  BP(mean): --  RR: 18 (2019 13:07) (16 - 18)  SpO2: 95% (2019 13:07) (95% - 100%)    PE:  WDWN in no distress  HEENT:  NC, PERRL, sclerae anicteric, conjunctivae clear, EOMI.  Sinuses nontender, no nasal exudate.  No buccal or pharyngeal lesions, erythema or exudate  Neck:  Supple, no adenopathy  Lungs:  No accessory muscle use, bilaterally clear to auscultation  Cor:  irreg irreg, S1, S2, no murmur appreciated  Abd:  Symmetric, normoactive BS.  Soft, nontender, no masses, guarding or rebound.  Liver and spleen not enlarged  Extrem:  No cyanosis or edema  Skin:  abrasion right scalp  Neuro: grossly intact  Musc: moving all limbs freely, no focal deficits    LABS:                        11.3   12.95 )-----------( 112      ( 2019 09:28 )             34.0       WBC Count: 12.95 K/uL (19 @ 09:28)  WBC Count: 16.96 K/uL (19 @ 13:38)          145  |  110<H>  |  23  ----------------------------<  97  4.0   |  28  |  1.00    Ca    7.5<L>      2019 09:28  Phos  2.3       Mg     2.0         TPro  6.2  /  Alb  2.9<L>  /  TBili  0.9  /  DBili  x   /  AST  57<H>  /  ALT  44  /  AlkPhos  56        Creatinine, Serum: 1.00 mg/dL (19 @ 09:28)  Creatinine, Serum: 1.70 mg/dL (19 @ 13:38)      Urinalysis Basic - ( 2019 17:29 )    Color: Yellow / Appearance: Slightly Turbid / S.020 / pH: x  Gluc: x / Ketone: Trace  / Bili: Small / Urobili: Negative   Blood: x / Protein: 150 mg/dL / Nitrite: Negative   Leuk Esterase: Moderate / RBC: 25-50 /HPF / WBC 26-50   Sq Epi: x / Non Sq Epi: Few / Bacteria: Few      MICROBIOLOGY:        RADIOLOGY & ADDITIONAL STUDIES:    --< from: US Renal (19 @ 19:44) >    EXAM:  US KIDNEY(S)                            PROCEDURE DATE:  2019          INTERPRETATION:  I agree with the report below. There is additional   finding of calcification in the wall of the left renal cyst, a benign   finding.    [facility] RADIOLOGIST PRELIMINARY REPORT    EXAM:    US Retroperitoneal Complete.     EXAM DATE/TIME:    2019 7:24 PM     CLINICAL HISTORY:    89 years old, male; Signs and symptoms; Other: Deshawn HTN weakness     TECHNIQUE:    Real-time ultrasound of the retroperitoneum with image documentation.   Complete   exam.     COMPARISON:    No relevant prior studies available.     FINDINGS:    Right kidney:  Right kidney measures up to 8.9 cm.    Left kidney:  Left kidney measures up to 9.8 cm. Multiple simple   appearing   cysts, largest mid pole 7.4 x 5 x 6.9 cm and lower pole 2.7 x 2.4 x 2.2   cm.    Aorta:  Not evaluated.    Common iliac arteries:  Not evaluated.    Inferior vena cava:  Not evaluated.      Intraperitoneal space:  Bladder is moderately fluid filled and appears   normal.     IMPRESSION:   1. Normal appearing bilateral kidneys and bladder. No evidence of   obstructive   uropathy or obstructive nephropathy.     2. Simple appearing left renal cysts as described above.      < from: Xray Chest 1 View AP/PA (19 @ 13:37) >  EXAM:  XR CHEST AP OR PA 1V                            PROCEDURE DATE:  2019          INTERPRETATION:  Chest, single portable view    HISTORY: Status post fall, weakness chest pain.    Comparison: None    IMPRESSION:    Support Devices: None  Heart: Cardiomegaly  Mediastinum:  Unremarkable  Lungs/Airways:  Unremarkable  Bones/Soft tissues: Unremarkable

## 2019-02-17 NOTE — PROGRESS NOTE ADULT - SUBJECTIVE AND OBJECTIVE BOX
Patient is a 89y old  Male who presents with a chief complaint of s/p mechanical fall/inability to ambulate (2019 17:03)      INTERVAL HPI: Pt seen and examined bedside, has no complaints. has some burning with urination. pt is AAOx3 and endorses pain and weakness of legs.    OVERNIGHT EVENTS:  T(F): 97.8 (19 @ 05:04), Max: 98.7 (19 @ 12:41)  HR: 96 (19 @ 05:04) (93 - 122)  BP: 121/76 (19 @ 05:04) (94/60 - 133/75)  RR: 17 (19 @ 05:04) (14 - 17)  SpO2: 98% (19 @ 09:41) (96% - 100%)  Wt(kg): --  I&O's Summary    2019 07:01  -  2019 07:00  --------------------------------------------------------  IN: 1825 mL / OUT: 75 mL / NET: 1750 mL        REVIEW OF SYSTEM:    Constitutional: No fever, chills, fatigue  Neuro: No headache, numbness, weakness  Resp: No cough, wheezing, shortness of breath  CVS: No chest pain, palpitations, leg swelling  GI: No abdominal pain, nausea, vomiting, diarrhea   : No dysuria, frequency, incontinence  Skin: No itching, burning, rashes, or lesions   Msk: No joint pain or swelling  Psych: No depression, anxiety, mood swings          PHYSICAL EXAM:  GENERAL: NAD, well-developed  HEAD:  Atraumatic, Normocephalic, small woung on scalp.  NECK: Supple, No JVD, Normal thyroid  HEART: Irregular rate and rhythm; No murmurs, rubs, or gallops  RESPIRATORY: CTA B/L, No wheezing / rhonchi  ABDOMEN: Soft, Nontender, Nondistended; Bowel sounds present  NEUROLOGY: A&Ox3, nonfocal, moving all extremities.  EXTREMITIES:  2+ Peripheral Pulses, No clubbing, cyanosis, or edema  SKIN: warm, dry, normal color, no rash or abnormal lesions        LABS:                        11.3   12.95 )-----------( 112      ( 2019 09:28 )             34.0     -    145  |  110<H>  |  23  ----------------------------<  97  4.0   |  28  |  1.00    Ca    7.5<L>      2019 09:28  Phos  2.3       Mg     2.0         TPro  6.2  /  Alb  2.9<L>  /  TBili  0.9  /  DBili  x   /  AST  57<H>  /  ALT  44  /  AlkPhos  56  -    PT/INR - ( 2019 09:28 )   PT: 48.6 sec;   INR: 4.08 ratio         PTT - ( 2019 13:38 )  PTT:38.0 sec  Urinalysis Basic - ( 2019 17:29 )    Color: Yellow / Appearance: Slightly Turbid / S.020 / pH: x  Gluc: x / Ketone: Trace  / Bili: Small / Urobili: Negative   Blood: x / Protein: 150 mg/dL / Nitrite: Negative   Leuk Esterase: Moderate / RBC: 25-50 /HPF / WBC 26-50   Sq Epi: x / Non Sq Epi: Few / Bacteria: Few      CAPILLARY BLOOD GLUCOSE                  MEDICATIONS  (STANDING):  alfuzosin 10 milliGRAM(s) Oral at bedtime  artificial  tears Solution 1 Drop(s) Both EYES two times a day  cefTRIAXone   IVPB      cefTRIAXone   IVPB 1 Gram(s) IV Intermittent every 24 hours  digoxin     Tablet 0.125 milliGRAM(s) Oral daily  finasteride 5 milliGRAM(s) Oral daily  gabapentin 300 milliGRAM(s) Oral two times a day  metoprolol succinate ER 25 milliGRAM(s) Oral daily  multivitamin 1 Tablet(s) Oral daily  sodium chloride 0.9%. 1000 milliLiter(s) (100 mL/Hr) IV Continuous <Continuous>    MEDICATIONS  (PRN):  acetaminophen   Tablet .. 650 milliGRAM(s) Oral every 6 hours PRN Mild Pain (1 - 3)  colchicine 0.6 milliGRAM(s) Oral daily PRN gout symptoms

## 2019-02-18 DIAGNOSIS — D64.9 ANEMIA, UNSPECIFIED: ICD-10-CM

## 2019-02-18 LAB
ANION GAP SERPL CALC-SCNC: 8 MMOL/L — SIGNIFICANT CHANGE UP (ref 5–17)
BUN SERPL-MCNC: 16 MG/DL — SIGNIFICANT CHANGE UP (ref 7–23)
CALCIUM SERPL-MCNC: 7.6 MG/DL — LOW (ref 8.5–10.1)
CHLORIDE SERPL-SCNC: 110 MMOL/L — HIGH (ref 96–108)
CO2 SERPL-SCNC: 28 MMOL/L — SIGNIFICANT CHANGE UP (ref 22–31)
CREAT SERPL-MCNC: 0.91 MG/DL — SIGNIFICANT CHANGE UP (ref 0.5–1.3)
GLUCOSE SERPL-MCNC: 94 MG/DL — SIGNIFICANT CHANGE UP (ref 70–99)
HCT VFR BLD CALC: 30.3 % — LOW (ref 39–50)
HGB BLD-MCNC: 10.1 G/DL — LOW (ref 13–17)
INR BLD: 2.63 RATIO — HIGH (ref 0.88–1.16)
MCHC RBC-ENTMCNC: 32.8 PG — SIGNIFICANT CHANGE UP (ref 27–34)
MCHC RBC-ENTMCNC: 33.3 GM/DL — SIGNIFICANT CHANGE UP (ref 32–36)
MCV RBC AUTO: 98.4 FL — SIGNIFICANT CHANGE UP (ref 80–100)
NRBC # BLD: 0 /100 WBCS — SIGNIFICANT CHANGE UP (ref 0–0)
PLATELET # BLD AUTO: 100 K/UL — LOW (ref 150–400)
POTASSIUM SERPL-MCNC: 3.5 MMOL/L — SIGNIFICANT CHANGE UP (ref 3.5–5.3)
POTASSIUM SERPL-SCNC: 3.5 MMOL/L — SIGNIFICANT CHANGE UP (ref 3.5–5.3)
PROTHROM AB SERPL-ACNC: 30.6 SEC — HIGH (ref 10–12.9)
RBC # BLD: 3.08 M/UL — LOW (ref 4.2–5.8)
RBC # FLD: 14.8 % — HIGH (ref 10.3–14.5)
SODIUM SERPL-SCNC: 146 MMOL/L — HIGH (ref 135–145)
WBC # BLD: 9.9 K/UL — SIGNIFICANT CHANGE UP (ref 3.8–10.5)
WBC # FLD AUTO: 9.9 K/UL — SIGNIFICANT CHANGE UP (ref 3.8–10.5)

## 2019-02-18 PROCEDURE — 99232 SBSQ HOSP IP/OBS MODERATE 35: CPT

## 2019-02-18 PROCEDURE — 99233 SBSQ HOSP IP/OBS HIGH 50: CPT

## 2019-02-18 RX ORDER — POTASSIUM CHLORIDE 20 MEQ
40 PACKET (EA) ORAL ONCE
Qty: 0 | Refills: 0 | Status: COMPLETED | OUTPATIENT
Start: 2019-02-18 | End: 2019-02-18

## 2019-02-18 RX ADMIN — GABAPENTIN 300 MILLIGRAM(S): 400 CAPSULE ORAL at 05:06

## 2019-02-18 RX ADMIN — FINASTERIDE 5 MILLIGRAM(S): 5 TABLET, FILM COATED ORAL at 11:01

## 2019-02-18 RX ADMIN — Medication 25 MILLIGRAM(S): at 18:16

## 2019-02-18 RX ADMIN — Medication 1 DROP(S): at 18:16

## 2019-02-18 RX ADMIN — Medication 0.12 MILLIGRAM(S): at 05:05

## 2019-02-18 RX ADMIN — Medication 25 MILLIGRAM(S): at 05:08

## 2019-02-18 RX ADMIN — Medication 1 DROP(S): at 05:06

## 2019-02-18 RX ADMIN — ALFUZOSIN HYDROCHLORIDE 10 MILLIGRAM(S): 10 TABLET, EXTENDED RELEASE ORAL at 22:37

## 2019-02-18 RX ADMIN — Medication 40 MILLIEQUIVALENT(S): at 15:29

## 2019-02-18 RX ADMIN — GABAPENTIN 300 MILLIGRAM(S): 400 CAPSULE ORAL at 18:16

## 2019-02-18 RX ADMIN — Medication 1 TABLET(S): at 11:01

## 2019-02-18 NOTE — PHYSICAL THERAPY INITIAL EVALUATION ADULT - PERTINENT HX OF CURRENT PROBLEM, REHAB EVAL
89 yo M presents to ED s/p mechanical fall.  Pt c/o generalized weakness and was unable to prop himself up and fell off the side of his bed. Pt hit his head but denies LOC.  Hx of frequent falls x 6 months. Reports new right thigh pain, tender to palpation.  CT head/spine neg, xrays (pelvis) neg for fracture

## 2019-02-18 NOTE — PROGRESS NOTE ADULT - SUBJECTIVE AND OBJECTIVE BOX
[facility] -- Steven Damon, Ed Agudelo, Swapnil Spann Savella  Office # [phone]    Follow Up:  Afib s/p fall    Subjective/Observations: Awake and alert, comfortable on RA.  Denies dizziness/lightheadedness.  Denies respiratory or cardiac symptoms.  No tele events.  Denies any form of bleeding    REVIEW OF SYSTEMS: All other review of systems is negative unless indicated above    PAST MEDICAL & SURGICAL HISTORY:  Afib  Hypertension  Hyperlipidemia  Status post cholecystectomy    MEDICATIONS  (STANDING):  alfuzosin 10 milliGRAM(s) Oral at bedtime  artificial  tears Solution 1 Drop(s) Both EYES two times a day  cefTRIAXone   IVPB      cefTRIAXone   IVPB 1 Gram(s) IV Intermittent every 24 hours  digoxin     Tablet 0.125 milliGRAM(s) Oral daily  finasteride 5 milliGRAM(s) Oral daily  gabapentin 300 milliGRAM(s) Oral two times a day  metoprolol tartrate 25 milliGRAM(s) Oral two times a day  multivitamin 1 Tablet(s) Oral daily  potassium chloride    Tablet ER 40 milliEquivalent(s) Oral once    MEDICATIONS  (PRN):  acetaminophen   Tablet .. 650 milliGRAM(s) Oral every 6 hours PRN Mild Pain (1 - 3)  colchicine 0.6 milliGRAM(s) Oral daily PRN gout symptoms    Allergies    No Known Allergies    Intolerances  Vital Signs Last 24 Hrs  T(C): 36.6 (18 Feb 2019 13:51), Max: 37.1 (17 Feb 2019 20:12)  T(F): 97.9 (18 Feb 2019 13:51), Max: 98.8 (17 Feb 2019 20:12)  HR: 91 (18 Feb 2019 13:51) (74 - 91)  BP: 120/73 (18 Feb 2019 15:16) (102/64 - 121/70)  BP(mean): --  RR: 18 (18 Feb 2019 13:51) (17 - 18)  SpO2: 98% (18 Feb 2019 13:51) (94% - 98%)    I&O's Summary    17 Feb 2019 07:01  -  18 Feb 2019 07:00  --------------------------------------------------------  IN: 50 mL / OUT: 0 mL / NET: 50 mL    PHYSICAL EXAM:  TELE: Afib at 70's  Constitutional: NAD, awake and alert, well-developed  HEENT: Moist Mucous Membranes, Anicteric  Pulmonary: Non-labored, breath sounds are clear bilaterally, No wheezing, rales or rhonchi  Cardiovascular: Regular, S1 and S2, No murmurs, rubs, gallops or clicks  Gastrointestinal: Bowel Sounds present, soft, nontender.   Lymph: +1 pitting LEedema. No lymphadenopathy.  Skin: No visible rashes or ulcers.  Psych:  Mood & affect appropriate    LABS: All Labs Reviewed:                        10.1   9.90  )-----------( 100      ( 18 Feb 2019 09:13 )             30.3                         11.3   12.95 )-----------( 112      ( 17 Feb 2019 09:28 )             34.0                         14.8   16.96 )-----------( 160      ( 16 Feb 2019 13:38 )             45.0     18 Feb 2019 09:13    146    |  110    |  16     ----------------------------<  94     3.5     |  28     |  0.91   17 Feb 2019 09:28    145    |  110    |  23     ----------------------------<  97     4.0     |  28     |  1.00   16 Feb 2019 13:38    145    |  107    |  35     ----------------------------<  163    5.7     |  26     |  1.70     Ca    7.6        18 Feb 2019 09:13  Ca    7.5        17 Feb 2019 09:28  Ca    8.6        16 Feb 2019 13:38  Phos  2.3       17 Feb 2019 09:28  Mg     2.0       17 Feb 2019 09:28    TPro  6.2    /  Alb  2.9    /  TBili  0.9    /  DBili  x      /  AST  57     /  ALT  44     /  AlkPhos  56     16 Feb 2019 13:38    PT/INR - ( 18 Feb 2019 09:13 )   PT: 30.6 sec;   INR: 2.63 ratio      < from: Xray Chest 1 View AP/PA (02.16.19 @ 13:37) >    EXAM:  XR CHEST AP OR PA 1V                          PROCEDURE DATE:  02/16/2019      INTERPRETATION:  Chest, single portable view    HISTORY: Status post fall, weakness chest pain.    Comparison: None    IMPRESSION:    Support Devices: None  Heart: Cardiomegaly  Mediastinum:  Unremarkable  Lungs/Airways:  Unremarkable  Bones/Soft tissues: Unremarkable    JUAN GIFFORD M.D., ATTENDING RADIOLOGIST  This document has been electronically signed. Feb 16 2019  3:58PM    < end of copied text >

## 2019-02-18 NOTE — PROGRESS NOTE ADULT - ASSESSMENT
89 y/o male with past medical history of A fib on AC (coumadin), HTN, HLD presented to the ER with fall while he is trying to get OOB. He also reports history of frequent falls over the last 6 months.    He has been following up with Dr. Zheng, cardiologist ([facility]). He has been on BB, digoxin and  Coumadin     - No tele events.  Remains in Afib with rate-controlled  - Dose Coumadin daily to keep INR 2-3.  Monitor for s/s bleeding  - No sign of acute ischemia. Troponin is negative.   - C/W digoxin and lopressor 25 mg po bid for rate-control  - Monitor and replete lytes, keep K>4, Mg>2.   - Will follow with you    Jocelyn Levy NP  Cardiology

## 2019-02-18 NOTE — PROGRESS NOTE ADULT - SUBJECTIVE AND OBJECTIVE BOX
Patient is a 89y old  Male who presents with a chief complaint of s/p mechanical fall/inability to ambulate (2019 17:03)      INTERVAL HPI: Pt seen and examined bedside, has no complaints. No  burning with urination. pt is AAOx3 and endorses pain and weakness of legs.    OVERNIGHT EVENTS:  Vital Signs Last 24 Hrs  T(C): 36.8 (2019 04:43), Max: 37.1 (2019 20:12)  T(F): 98.2 (2019 04:43), Max: 98.8 (2019 20:12)  HR: 85 (2019 08:32) (74 - 87)  BP: 121/70 (2019 04:43) (100/63 - 121/70)  BP(mean): --  RR: 17 (2019 04:43) (17 - 18)  SpO2: 94% (2019 08:32) (94% - 96%)      REVIEW OF SYSTEM:    Constitutional: No fever, chills, fatigue  Neuro: No headache, numbness, weakness  Resp: No cough, wheezing, shortness of breath  CVS: No chest pain, palpitations, leg swelling  GI: No abdominal pain, nausea, vomiting, diarrhea   : No dysuria, frequency, incontinence  Skin: No itching, burning, rashes, or lesions   Msk: No joint pain or swelling  Psych: No depression, anxiety, mood swings          PHYSICAL EXAM:  GENERAL: NAD, well-developed  HEAD:  Atraumatic, Normocephalic, small woung on scalp.  NECK: Supple, No JVD, Normal thyroid  HEART: Irregular rate and rhythm; No murmurs, rubs, or gallops  RESPIRATORY: CTA B/L, No wheezing / rhonchi  ABDOMEN: Soft, Nontender, Nondistended; Bowel sounds present  NEUROLOGY: A&Ox3, nonfocal, moving all extremities.  EXTREMITIES:  2+ Peripheral Pulses, No clubbing, cyanosis, or edema  SKIN: warm, dry, normal color, no rash or abnormal lesions        LABS:                        10.1   9.90  )-----------( 100      ( 2019 09:13 )             30.3     2019 09:13    146    |  110    |  16     ----------------------------<  94     3.5     |  28     |  0.91     Ca    7.6        2019 09:13      PT/INR - ( 2019 09:13 )   PT: 30.6 sec;   INR: 2.63 ratio         PTT - ( 2019 13:38 )  PTT:38.0 sec  Urinalysis Basic - ( 2019 17:29 )    Color: Yellow / Appearance: Slightly Turbid / S.020 / pH: x  Gluc: x / Ketone: Trace  / Bili: Small / Urobili: Negative   Blood: x / Protein: 150 mg/dL / Nitrite: Negative   Leuk Esterase: Moderate / RBC: 25-50 /HPF / WBC 26-50   Sq Epi: x / Non Sq Epi: Few / Bacteria: Few      CAPILLARY BLOOD GLUCOSE        UCx       RADIOLOGY & ADDITIONAL TESTS:                  MEDICATIONS  (STANDING):  alfuzosin 10 milliGRAM(s) Oral at bedtime  artificial  tears Solution 1 Drop(s) Both EYES two times a day  cefTRIAXone   IVPB      cefTRIAXone   IVPB 1 Gram(s) IV Intermittent every 24 hours  digoxin     Tablet 0.125 milliGRAM(s) Oral daily  finasteride 5 milliGRAM(s) Oral daily  gabapentin 300 milliGRAM(s) Oral two times a day  metoprolol succinate ER 25 milliGRAM(s) Oral daily  multivitamin 1 Tablet(s) Oral daily  sodium chloride 0.9%. 1000 milliLiter(s) (100 mL/Hr) IV Continuous <Continuous>    MEDICATIONS  (PRN):  acetaminophen   Tablet .. 650 milliGRAM(s) Oral every 6 hours PRN Mild Pain (1 - 3)  colchicine 0.6 milliGRAM(s) Oral daily PRN gout symptoms

## 2019-02-18 NOTE — PHYSICAL THERAPY INITIAL EVALUATION ADULT - DID THE PATIENT HAVE SURGERY?
Head and Neck Surgical Progress Note      Chief Complaint: Tonsil cancer follow up    HPI:  Fabio Persaud is a 47 year old male with a PMH of chronic neck and back pain, migraines, and HTN who originally presented for ED follow up for asymmetric tonsils.  In the past pt had been evaluated by his PCP and she noted that his left tonsil was much larger than the right.  He was seen by Dr. Oreilly who started him on Clindamycin.  Over the weekend after that visit he went to the ED over the weekend when he noticed that the tonsil had not gotten any smaller and he was having some shortness of breath. Due to the appearance of the tonsil, it was thought to be a possible lymphoma.  Patient underwent bilateral tonsillectomy on 9/9/16.  Pathology revealed p16+ squamous cell carcinoma.  Margins were clear but narrowly excised at 0.2cm.  He then underwent re-excision via TORS and left neck dissection on 9/21/16.  Final pathology revealed no malignancy in tonsillar bed and one(1/24) positive lymph node.  Final pathology revealed no malignancy in tonsillar bed and one(1/24) positive lymph node.  After discussion with the treatment team, it is felt that adjuvant treatment is not needed.   A post treatment PET on 1/17/17 was negative for hypermetabolic malignancy.      He presents to [facility] today for follow up.  Overall he is doing well.  Patient continues to have some dysphagia and reports that he has to swallow liquids carefully. Denies shortness of breath, throat pain, or any new neck masses.      REVIEW OF SYSTEMS:  Review of systems completed and all negative, except as noted in HPI.    Past Medical History:   Diagnosis Date   • Cervical radiculopathy 10/13/2012    narcotic agreement signed   • Diverticulitis 10/13/2012   • Diverticulosis    • HTN (hypertension)    • Malignant neoplasm (CMS/HCC) 9/2016    left tonsil- mod differentiated squamous  cell   • Migraine    • Seasonal allergies      Past Surgical History:   Procedure  Laterality Date   • Appendectomy  1986   • Tonsillectomy  9/9/2016    at [facility]     ALLERGIES:  No Known Allergies  Current Outpatient Prescriptions   Medication Sig Dispense Refill   • amlodipine-benazepril (LOTREL) 10-40 MG per capsule TAKE ONE CAPSULE BY MOUTH DAILY 90 capsule 3   • Omeprazole Magnesium (PRILOSEC OTC PO) Take 1 tablet by mouth daily as needed.     • VITAMIN D, CHOLECALCIFEROL, PO Take 1 tablet by mouth daily. Takes inconsistently     • zolmitriptan (ZOMIG) 5 MG tablet Take 1 tablet by mouth as needed for Migraine. 10 tablet 3   • traMADol (ULTRAM) 50 MG tablet TAKE TWO TABLETS BY MOUTH TWICE DAILY 120 tablet 1   • Green Tea, Camillia sinensis, (GREEN TEA EXTRACT PO) Take 1 tablet by mouth daily.     • cyclobenzaprine (FLEXERIL) 5 MG tablet Take 1 tablet by mouth 2 times daily. 60 tablet 1   • gabapentin (NEURONTIN) 300 MG capsule Take 1 capsule by mouth 3 times daily. 90 capsule 0   • Multiple Vitamins-Minerals (MULTIVITAMIN PO) Take 1 tablet by mouth daily.       No current facility-administered medications for this encounter.       Family History   Problem Relation Age of Onset   • Heart disease Mother      mitral valve   • High cholesterol Father    • Hypertension Father      Social History     Social History   • Marital status:      Spouse name: N/A   • Number of children: N/A   • Years of education: N/A     Occupational History   • Not on file.     Social History Main Topics   • Smoking status: Never Smoker   • Smokeless tobacco: Never Used   • Alcohol use 2.4 oz/week     4 Standard drinks or equivalent per week      Comment: social   • Drug use: No   • Sexual activity: Not on file     Other Topics Concern   • Not on file     Social History Narrative   • No narrative on file       PHYSICAL EXAM:  Visit Vitals  /85 (BP Location: [facility], Patient Position: Sitting)   Pulse 79   Temp 97.8 °F (36.6 °C) (Oral)   Resp 18   Wt 95.8 kg   SpO2 98%   BMI 30.30 kg/m²        General: Well-developed, well-nourished male. In no acute distress.  Skin: warm with normal turgor  Head: atraumatic and normocephalic  Face:Normal appearance.   Ears:  Assessment of hearing with conversational voice normal.   Nose: Septum midline. Turbinates normal. Mucosal color normal. No polyps seen.  Oral Cavity:  Lips normal. Teeth normal. Oral cavity exam normal  Oropharynx: Hard and soft palate normal. Tonsils removed  Neck: supple, no thyromegaly.  Left neck incision well healed, pt does have slight redness to the skin.  Lymph: Anterior and Posterior cervical lymph nodes were examined, no clear evidence of adenopathy.  Neurologic:  The person is attentive with normal concentration. Language is fluent. Speech is normal.  Psych:  Normal affect, normal interaction    Laryngoscopy:  See procedure note.      PET 1/17/17:  Impression: Negative for hypermetabolic malignancy. No convincing evidence  for active disease at site of primary tonsillar tumor. Negative for FDG  avid metastatic disease.    CT Neck:  Results for orders placed during the hospital encounter of 09/02/16   CT Neck Soft Tissue W/ contrast    Impression IMPRESSION:    Enlarged palatine and lingual tonsils. No abscess is appreciated. There is  moderate narrowing of the oropharyngeal airway due to tonsillar  enlargement.    Cervical lymphadenopathy.       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 Left neck level II-IV lymph nodes, dissection - 9/21/16:     - Metastatic squamous cell carcinoma involving one of twenty-four lymph nodes     without extranodal extension (1/24; 0.7 cm deposit)         B. 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  We did obtain clear  n/a margins, but it was 0.2 cm which is not ideal.  Underwent re-excision via TORS and left neck dissection on 9/21/16.  Final pathology revealed no malignancy in tonsillar bed and one(1/24) positive lymph node.  After discussion with the treatment team, it is felt that adjuvant treatment is not needed.  Post treatment PET on 1/17/17 was negative for hyperbolic malignancy.   On laryngoscopy and clinical exam there continues to be no evidence of recurrence.     Plan:  1. Follow up in [facility] in 4-6 months in his office - sooner if symptoms develop    NAPOLEON Gee  [phone]    On 11/2/2017, IEnma PA-C scribed the services personally performed by Reece Wright MD     The documentation recorded by the scribe accurately and completely reflects the service(s) I personally performed and the decisions made by me.     Reece Wright MD, FACS  [facility]- Head and Ne[facility]

## 2019-02-18 NOTE — PROGRESS NOTE ADULT - ASSESSMENT
89 yo m pmx hx of Afib on coumadin, HTN, HLD, BPH, neuropathy presents to the ED s/p mechanical this morning. Admitted for sepsis  sec to uti, inability to ambulate

## 2019-02-18 NOTE — PROGRESS NOTE ADULT - SUBJECTIVE AND OBJECTIVE BOX
[facility], Division of Infectious Diseases  MICHELLE Dueñas A. Lee  [phone]    Name: TRENTON ARMSTRONG  Age: 89y  Gender: Male  MRN: [id number]    Interval History--  Notes reviewed. Feeling stronger. No fevers, chills, or rigors. Wants to go to rehab and then family exploring assisted living for him closer to them in [place].      Past Medical History--  Afib  Hypertension  Hyperlipidemia  Status post cholecystectomy      For details regarding the patient's social history, family history, and other miscellaneous elements, please refer the initial infectious diseases consultation and/or the admitting history and physical examination for this admission.    Allergies    No Known Allergies    Intolerances        Medications--  Antibiotics:  cefTRIAXone   IVPB      cefTRIAXone   IVPB 1 Gram(s) IV Intermittent every 24 hours    Immunologic:    Other:  acetaminophen   Tablet .. PRN  alfuzosin  artificial  tears Solution  colchicine PRN  digoxin     Tablet  finasteride  gabapentin  metoprolol tartrate  multivitamin      Review of Systems--  A 10-point review of systems was obtained.   Review of systems otherwise negative except as previously noted.    Physical Examination--  Vital Signs: T(F): 97.9 (02-18-19 @ 13:51), Max: 98.8 (02-17-19 @ 20:12)  HR: 91 (02-18-19 @ 13:51)  BP: 120/73 (02-18-19 @ 15:16)  RR: 18 (02-18-19 @ 13:51)  SpO2: 98% (02-18-19 @ 13:51)  Wt(kg): --  General: Nontoxic-appearing Male in no acute distress.  HEENT: AT/NC. Anicteric. Conjunctiva pink and moist. Oropharynx clear. Dentition fair.  Neck: Not rigid. No sense of mass.  Nodes: None palpable.  Lungs: Diminished breath sounds bilaterally without rales, wheezing or rhonchi  Heart: Regular rate and rhythm. No Murmur. No rub. No gallop. No palpable thrill.  Abdomen: Bowel sounds present and normoactive. Soft. Nondistended. Nontender. No sense of mass. No organomegaly.  Extremities: No cyanosis or clubbing. 1+edema.   Skin: Warm. Dry. Good turgor. No rash. No vasculitic stigmata.  Psychiatric: Appropriate affect and mood for situation.         Laboratory Studies--  CBC                        10.1   9.90  )-----------( 100      ( 18 Feb 2019 09:13 )             30.3     WBC Count: 12.95 K/uL (02-17-19 @ 09:28)  WBC Count: 16.96 K/uL (02-16-19 @ 13:38)      Chemistries  02-18    146<H>  |  110<H>  |  16  ----------------------------<  94  3.5   |  28  |  0.91    Ca    7.6<L>      18 Feb 2019 09:13  Phos  2.3     02-17  Mg     2.0     02-17        Culture Data  Culture - Urine (collected 17 Feb 2019 01:00)  Source: .Urine Clean Catch (Midstream)  Preliminary Report (18 Feb 2019 12:12):    50,000 - 99,000 CFU/mL Enterococcus species    <10,000 CFU/ml Normal Urogenital nando present    Culture - Blood (collected 16 Feb 2019 17:18)  Source: .Blood Blood-Peripheral  Preliminary Report (17 Feb 2019 18:01):    No growth to date.    Culture - Blood (collected 16 Feb 2019 17:18)  Source: .Blood Blood-Peripheral  Preliminary Report (17 Feb 2019 18:01):    No growth to date.

## 2019-02-18 NOTE — PROGRESS NOTE ADULT - ASSESSMENT
89 yo m pmx hx of Afib on coumadin, HTN, HLD, BPH, neuropathy presents to the ED s/p mechanical fall with no localizing symptoms but noted leukocytosis and abn UA .    If patient truly had a UTI significant enough to cause a decline in his performance status I do not think that a single dose of Zosyn would explain his improvement.  Patient has improved despite ineffective antibiotic therapy vs urine isolate.  No urine symptoms at present to implicate uti

## 2019-02-19 ENCOUNTER — TRANSCRIPTION ENCOUNTER (OUTPATIENT)
Age: 84
End: 2019-02-19

## 2019-02-19 LAB
ANION GAP SERPL CALC-SCNC: 8 MMOL/L — SIGNIFICANT CHANGE UP (ref 5–17)
BUN SERPL-MCNC: 17 MG/DL — SIGNIFICANT CHANGE UP (ref 7–23)
CALCIUM SERPL-MCNC: 8.1 MG/DL — LOW (ref 8.5–10.1)
CHLORIDE SERPL-SCNC: 108 MMOL/L — SIGNIFICANT CHANGE UP (ref 96–108)
CO2 SERPL-SCNC: 30 MMOL/L — SIGNIFICANT CHANGE UP (ref 22–31)
CREAT SERPL-MCNC: 0.96 MG/DL — SIGNIFICANT CHANGE UP (ref 0.5–1.3)
FERRITIN SERPL-MCNC: 419 NG/ML — HIGH (ref 30–400)
GLUCOSE SERPL-MCNC: 95 MG/DL — SIGNIFICANT CHANGE UP (ref 70–99)
HCT VFR BLD CALC: 32.1 % — LOW (ref 39–50)
HGB BLD-MCNC: 10.7 G/DL — LOW (ref 13–17)
INR BLD: 1.61 RATIO — HIGH (ref 0.88–1.16)
IRON SATN MFR SERPL: 21 % — SIGNIFICANT CHANGE UP (ref 16–55)
IRON SATN MFR SERPL: 42 UG/DL — LOW (ref 45–165)
MCHC RBC-ENTMCNC: 33 PG — SIGNIFICANT CHANGE UP (ref 27–34)
MCHC RBC-ENTMCNC: 33.3 GM/DL — SIGNIFICANT CHANGE UP (ref 32–36)
MCV RBC AUTO: 99.1 FL — SIGNIFICANT CHANGE UP (ref 80–100)
NRBC # BLD: 0 /100 WBCS — SIGNIFICANT CHANGE UP (ref 0–0)
OB PNL STL: NEGATIVE — SIGNIFICANT CHANGE UP
PLATELET # BLD AUTO: 109 K/UL — LOW (ref 150–400)
POTASSIUM SERPL-MCNC: 4.7 MMOL/L — SIGNIFICANT CHANGE UP (ref 3.5–5.3)
POTASSIUM SERPL-SCNC: 4.7 MMOL/L — SIGNIFICANT CHANGE UP (ref 3.5–5.3)
PROTHROM AB SERPL-ACNC: 18.6 SEC — HIGH (ref 10–12.9)
RBC # BLD: 3.24 M/UL — LOW (ref 4.2–5.8)
RBC # FLD: 14.6 % — HIGH (ref 10.3–14.5)
SODIUM SERPL-SCNC: 146 MMOL/L — HIGH (ref 135–145)
TIBC SERPL-MCNC: 204 UG/DL — LOW (ref 220–430)
UIBC SERPL-MCNC: 162 UG/DL — SIGNIFICANT CHANGE UP (ref 110–370)
WBC # BLD: 9.81 K/UL — SIGNIFICANT CHANGE UP (ref 3.8–10.5)
WBC # FLD AUTO: 9.81 K/UL — SIGNIFICANT CHANGE UP (ref 3.8–10.5)

## 2019-02-19 PROCEDURE — 99233 SBSQ HOSP IP/OBS HIGH 50: CPT | Mod: GC

## 2019-02-19 PROCEDURE — 99233 SBSQ HOSP IP/OBS HIGH 50: CPT

## 2019-02-19 RX ORDER — POTASSIUM PHOSPHATE, MONOBASIC POTASSIUM PHOSPHATE, DIBASIC 236; 224 MG/ML; MG/ML
15 INJECTION, SOLUTION INTRAVENOUS ONCE
Qty: 0 | Refills: 0 | Status: COMPLETED | OUTPATIENT
Start: 2019-02-19 | End: 2019-02-19

## 2019-02-19 RX ORDER — WARFARIN SODIUM 2.5 MG/1
5 TABLET ORAL ONCE
Qty: 0 | Refills: 0 | Status: DISCONTINUED | OUTPATIENT
Start: 2019-02-19 | End: 2019-02-19

## 2019-02-19 RX ORDER — WARFARIN SODIUM 2.5 MG/1
2.5 TABLET ORAL ONCE
Qty: 0 | Refills: 0 | Status: COMPLETED | OUTPATIENT
Start: 2019-02-19 | End: 2019-02-19

## 2019-02-19 RX ORDER — MAGNESIUM SULFATE 500 MG/ML
1 VIAL (ML) INJECTION ONCE
Qty: 0 | Refills: 0 | Status: COMPLETED | OUTPATIENT
Start: 2019-02-19 | End: 2019-02-19

## 2019-02-19 RX ADMIN — Medication 25 MILLIGRAM(S): at 05:21

## 2019-02-19 RX ADMIN — FINASTERIDE 5 MILLIGRAM(S): 5 TABLET, FILM COATED ORAL at 11:13

## 2019-02-19 RX ADMIN — Medication 1 DROP(S): at 17:08

## 2019-02-19 RX ADMIN — GABAPENTIN 300 MILLIGRAM(S): 400 CAPSULE ORAL at 17:08

## 2019-02-19 RX ADMIN — Medication 25 MILLIGRAM(S): at 17:08

## 2019-02-19 RX ADMIN — Medication 1 DROP(S): at 05:21

## 2019-02-19 RX ADMIN — ALFUZOSIN HYDROCHLORIDE 10 MILLIGRAM(S): 10 TABLET, EXTENDED RELEASE ORAL at 21:08

## 2019-02-19 RX ADMIN — Medication 0.12 MILLIGRAM(S): at 05:21

## 2019-02-19 RX ADMIN — POTASSIUM PHOSPHATE, MONOBASIC POTASSIUM PHOSPHATE, DIBASIC 62.5 MILLIMOLE(S): 236; 224 INJECTION, SOLUTION INTRAVENOUS at 11:13

## 2019-02-19 RX ADMIN — Medication 100 GRAM(S): at 09:41

## 2019-02-19 RX ADMIN — GABAPENTIN 300 MILLIGRAM(S): 400 CAPSULE ORAL at 05:21

## 2019-02-19 RX ADMIN — Medication 1 TABLET(S): at 11:13

## 2019-02-19 RX ADMIN — WARFARIN SODIUM 2.5 MILLIGRAM(S): 2.5 TABLET ORAL at 21:08

## 2019-02-19 NOTE — DISCHARGE NOTE ADULT - PATIENT PORTAL LINK FT
You can access the [facility] Patient Portal, offered by [facility], by registering with the following website: [email or web address]

## 2019-02-19 NOTE — PROGRESS NOTE ADULT - SUBJECTIVE AND OBJECTIVE BOX
[facility] -- Steven Damon, Jammie, Ed, Swapnil Spann Savella  Office # [phone]    Follow Up:  Afib s/p fall    Subjective/Observations: Awake and alert, not in any discomfort.  OOB with PT, no c/o SNOWDEN.  Non-orthopneic.  No c/o CP or palpitations.  No c/o any form of bledding.    REVIEW OF SYSTEMS: All other review of systems is negative unless indicated above    PAST MEDICAL & SURGICAL HISTORY:  Afib  Hypertension  Hyperlipidemia  Status post cholecystectomy    MEDICATIONS  (STANDING):  alfuzosin 10 milliGRAM(s) Oral at bedtime  artificial  tears Solution 1 Drop(s) Both EYES two times a day  digoxin     Tablet 0.125 milliGRAM(s) Oral daily  finasteride 5 milliGRAM(s) Oral daily  gabapentin 300 milliGRAM(s) Oral two times a day  magnesium sulfate  IVPB 1 Gram(s) IV Intermittent once  metoprolol tartrate 25 milliGRAM(s) Oral two times a day  multivitamin 1 Tablet(s) Oral daily  potassium phosphate IVPB 15 milliMole(s) IV Intermittent once  warfarin 5 milliGRAM(s) Oral once    MEDICATIONS  (PRN):  acetaminophen   Tablet .. 650 milliGRAM(s) Oral every 6 hours PRN Mild Pain (1 - 3)  colchicine 0.6 milliGRAM(s) Oral daily PRN gout symptoms    Allergies    No Known Allergies    Intolerances    Vital Signs Last 24 Hrs  T(C): 36.7 (19 Feb 2019 05:00), Max: 37.1 (18 Feb 2019 21:07)  T(F): 98.1 (19 Feb 2019 05:00), Max: 98.7 (18 Feb 2019 21:07)  HR: 80 (19 Feb 2019 05:00) (69 - 91)  BP: 109/70 (19 Feb 2019 05:00) (109/67 - 131/77)  BP(mean): --  RR: 17 (19 Feb 2019 05:00) (16 - 18)  SpO2: 97% (19 Feb 2019 05:00) (95% - 98%)    I&O's Summary      PHYSICAL EXAM:  TELE: Afib at 70's  Constitutional: NAD, awake and alert, well-developed  HEENT: Moist Mucous Membranes, Anicteric  Pulmonary: Non-labored, breath sounds are clear bilaterally, No wheezing, rales or rhonchi  Cardiovascular: Irregularly irregular, S1 and S2, No murmurs, rubs, gallops or clicks  Gastrointestinal: Bowel Sounds present, soft, nontender.   Lymph: +1 pitting LE edema. No lymphadenopathy.  Skin: No visible rashes or ulcers.  Psych:  Mood & affect appropriate    LABS: All Labs Reviewed:                        10.7   9.81  )-----------( 109      ( 19 Feb 2019 06:54 )             32.1                         10.1   9.90  )-----------( 100      ( 18 Feb 2019 09:13 )             30.3                         11.3   12.95 )-----------( 112      ( 17 Feb 2019 09:28 )             34.0     19 Feb 2019 06:54    146    |  108    |  17     ----------------------------<  95     4.7     |  30     |  0.96   18 Feb 2019 09:13    146    |  110    |  16     ----------------------------<  94     3.5     |  28     |  0.91   17 Feb 2019 09:28    145    |  110    |  23     ----------------------------<  97     4.0     |  28     |  1.00     Ca    8.1        19 Feb 2019 06:54  Ca    7.6        18 Feb 2019 09:13  Ca    7.5        17 Feb 2019 09:28  Phos  2.4       19 Feb 2019 06:54  Phos  2.3       17 Feb 2019 09:28  Mg     1.9       19 Feb 2019 06:54  Mg     2.0       17 Feb 2019 09:28    TPro  6.2    /  Alb  2.9    /  TBili  0.9    /  DBili  x      /  AST  57     /  ALT  44     /  AlkPhos  56     16 Feb 2019 13:38    PT/INR - ( 19 Feb 2019 06:54 )   PT: 18.6 sec;   INR: 1.61 ratio       < from: CT Head No Cont (02.16.19 @ 14:19) >    EXAM:  CT BRAIN                            PROCEDURE DATE:  02/16/2019          INTERPRETATION:  CT BRAIN     Axial sections were obtained from base to vertex without contrast.    Clinical History: Fall head injury    Comparison: None    FINDINGS:    There is no mass, mass effect or midline shift. There are no intraaxial   or extraaxial fluid collections. There is no evidence for acute segmental   infarct.  Ventricular system and sulcal pattern are within normal limits   for the patient's age.    The visualized sinuses and mastoid air cells are unremarkable.    Bones and soft tissues are normal.     IMPRESSION: No acute findings.    JUAN GIFFORD M.D., ATTENDING RADIOLOGIST  This document has been electronically signed. Feb 16 2019  2:25PM      < end of copied text >    < from: Xray Chest 1 View AP/PA (02.16.19 @ 13:37) >    EXAM:  XR CHEST AP OR PA 1V                            PROCEDURE DATE:  02/16/2019          INTERPRETATION:  Chest, single portable view    HISTORY: Status post fall, weakness chest pain.    Comparison: None    IMPRESSION:    Support Devices: None  Heart: Cardiomegaly  Mediastinum:  Unremarkable  Lungs/Airways:  Unremarkable  Bones/Soft tissues: Unremarkable    JUAN GIFFORD M.D., ATTENDING RADIOLOGIST  This document has been electronically signed. Feb 16 2019  3:58PM      < end of copied text >

## 2019-02-19 NOTE — DISCHARGE NOTE ADULT - CARE PLAN
Principal Discharge DX:	Sepsis  Goal:	Resolved  Assessment and plan of treatment:	Your were found to have Leukocytosis with elevated lactate and hypotensive on presentation, which indicated sepsis. As your urine was infected, it is likely this caused your septic reaction. This was treated with fluids and anitbiotics. Your blood pressure, WBC and lactate improved throughout hospital course. Please follow up with your pmd on discharge.  Secondary Diagnosis:	Afib  Goal:	Controlled  Assessment and plan of treatment:	Continue digoxin and warfarin. Your lopressor was increased to 25mg twice a day. Please follow up with your cardiologist within one week of discharge  Secondary Diagnosis:	Weakness  Goal:	Controlled  Assessment and plan of treatment:	PT recommended Subacute rehab, continue to ambulate as tolerated  Secondary Diagnosis:	OSMAN (acute kidney injury)  Goal:	Resolved  Assessment and plan of treatment:	Your renal function was slightly impaired on arrival to the hospital. This was treated with fluids and gradually resolved.  Secondary Diagnosis:	Neuropathic pain  Goal:	Controlled  Assessment and plan of treatment:	Continue gabapentin  Secondary Diagnosis:	Gout  Goal:	Controlled  Assessment and plan of treatment:	Continue colchicine  Secondary Diagnosis:	BPH (benign prostatic hyperplasia)  Goal:	Controlled  Assessment and plan of treatment:	Continue finasteride, alfuzosin

## 2019-02-19 NOTE — DISCHARGE NOTE ADULT - PLAN OF CARE
Resolved Your were found to have Leukocytosis with elevated lactate and hypotensive on presentation, which indicated sepsis. As your urine was infected, it is likely this caused your septic reaction. This was treated with fluids and anitbiotics. Your blood pressure, WBC and lactate improved throughout hospital course. Please follow up with your pmd on discharge. Controlled Continue digoxin and warfarin. Your lopressor was increased to 25mg twice a day. Please follow up with your cardiologist within one week of discharge PT recommended Subacute rehab, continue to ambulate as tolerated Your renal function was slightly impaired on arrival to the hospital. This was treated with fluids and gradually resolved. Continue gabapentin Continue colchicine Continue finasteride, alfuzosin

## 2019-02-19 NOTE — PROGRESS NOTE ADULT - PROBLEM SELECTOR PLAN 9
Chronic - continue finasteride
Chronic - continue finasteride, alfuzosin
Chronic - continue finasteride

## 2019-02-19 NOTE — DISCHARGE NOTE ADULT - MEDICATION SUMMARY - MEDICATIONS TO TAKE
I will START or STAY ON the medications listed below when I get home from the hospital:    finasteride 5 mg oral tablet  -- 1 tab(s) by mouth once a day  -- Indication: For BPH (benign prostatic hyperplasia)    acetaminophen 325 mg oral tablet  -- 2 tab(s) by mouth every 6 hours, As needed, Mild Pain (1 - 3)  -- Indication: For Pain    Uroxatral 10 mg oral tablet, extended release  -- 1 tab(s) by mouth once a day  -- Indication: For BPH (benign prostatic hyperplasia)    digoxin 125 mcg (0.125 mg) oral tablet  -- 1 tab(s) by mouth once a day  -- Indication: For Afib    Coumadin 5 mg oral tablet  -- 1 tab(s) by mouth once a day  -- Indication: For Afib    gabapentin 300 mg oral capsule  -- 1 cap(s) by mouth 2 times a day  -- Indication: For Neuropathic pain    colchicine 0.6 mg oral tablet  -- 1 tab(s) by mouth once a day  -- Indication: For Gout    metoprolol tartrate 25 mg oral tablet  -- 1 tab(s) by mouth 2 times a day  -- Indication: For Afib    Restasis 0.05% ophthalmic emulsion  -- 1 drop(s) to each affected eye once a day  -- Indication: For eye drops    ocular lubricant ophthalmic solution  -- 1 drop(s) to each affected eye 2 times a day  -- Indication: For eye drops    Vitamin B Complex 100  -- 1 tab(s) by mouth once a day  -- Indication: For Vitamins

## 2019-02-19 NOTE — DISCHARGE NOTE ADULT - HOSPITAL COURSE
89 yo m pmx hx of Afib on coumadin, HTN, HLD, BPH, neuropathy presents to the ED s/p mechanical this morning.  Pt reports that this morning while trying to get out of bed, he felt generalized weakness and was unable to prop himself up with his legs/arms and fell off the side of his bed, he hit his head but denies LOC.  Hx of frequent falls over the last 6 months and inbalance for many years.  Reports new right thigh pain that started 1 week ago, that is tender to palpation. Denies cp, sob, abdominal pain, dysuria, headaches, changes in vision  In the ed, vitals: bp 94/60, afebrile, HR 95, 02 97% on RA. Labs: wbc 16.96, INR 3.85, lactate 4.6 -> 2.8 s/p 2.5 L ns, potassium 5.7, tropx neg, CT head/spine neg, xrays neg for fracture. Given 2.5 L ns, zosyn.    Admitted to [facility] for sepsis 2/2 UTI and inability to ambulate s/p mechanical fall.  ID (Ap) started on Rocephin for UTI for 2days as patient became asymptomatic, afebrile without leukocytosis. BCx no growth to date, Ucx positive for 50-99K Enterococcous. As per ID, unclear if UTI true source of sepsis however no other source of infection. Cardio (Alex) examined patient, held coumadin as INR supratheraupetic and increased Lopressor 25mg BID. Overnight on 2/18, patient on tele noted to have non-sustained RVR up to 180's during activity and up to 140's during ambulation.  Also had one 2 episodes of pauses, 2.2 sec followed by 3.28 sec overnight. Recommended to continue digoxin and lopressor. GI () consulted for anemia, recommended to continue coumadin as FOBT negative and H/H stable. PT recommended [facility] on discharge.     Patient tolerated the above treatment well. Seen and examined on day of discharge. Medically optimized and hemodynamically stable for discharge to [facility] with close follow up with PMD, Infectious disease (Ap) and cardiology (Alex). 89 yo m pmx hx of Afib on coumadin, HTN, HLD, BPH, neuropathy presents to the ED s/p mechanical this morning.  Pt reports that this morning while trying to get out of bed, he felt generalized weakness and was unable to prop himself up with his legs/arms and fell off the side of his bed, he hit his head but denies LOC.  Hx of frequent falls over the last 6 months and inbalance for many years.  Reports new right thigh pain that started 1 week ago, that is tender to palpation. Denies cp, sob, abdominal pain, dysuria, headaches, changes in vision. In the ed, vitals: bp 94/60, afebrile, HR 95, 02 97% on RA. Labs: wbc 16.96, INR 3.85, lactate 4.6 -> 2.8 s/p 2.5 L ns, potassium 5.7, tropx neg, CT head/spine neg, xrays neg for fracture. Given 2.5 L ns, zosyn.    Admitted to [facility] for sepsis 2/2 UTI and inability to ambulate s/p mechanical fall.  ID (Ap) started on Rocephin for UTI for 2days as patient became asymptomatic, afebrile without leukocytosis. BCx no growth to date, Ucx positive for 50-99K Enterococcous. As per ID, unclear if UTI true source of sepsis however no other source of infection. Cardio (Alex) examined patient, held coumadin as INR supratheraupetic and increased Lopressor 25mg BID. Overnight on 2/18, patient on tele noted to have non-sustained RVR up to 180's during activity and up to 140's during ambulation.  Also had one 2 episodes of pauses, 2.2 sec followed by 3.28 sec overnight. Recommended to continue digoxin and lopressor. GI () consulted for anemia, recommended to continue coumadin as FOBT negative and H/H stable. Gave coumadin 2.5mg on 2/19. PT recommended [facility] on discharge.     Patient tolerated the above treatment well. Seen and examined on day of discharge. Medically optimized and hemodynamically stable for discharge to [facility] with close follow up with PMD, Infectious disease (Ap) and cardiology (Marce).     Vital Signs Last 24 Hrs  T(C): 36.3 (20 Feb 2019 04:44), Max: 36.9 (19 Feb 2019 20:26)  T(F): 97.4 (20 Feb 2019 04:44), Max: 98.4 (19 Feb 2019 20:26)  HR: 97 (20 Feb 2019 04:44) (73 - 97)  BP: 140/86 (20 Feb 2019 04:44) (104/67 - 140/86)  BP(mean): --  RR: 16 (20 Feb 2019 04:44) (16 - 18)  SpO2: 95% (20 Feb 2019 04:44) (95% - 98%)    Physical Exam:  General: NAD  HEENT: 	small abrasion on L forehead s/p fall, EOMI bl, moist mucous membranes   Neck: Supple, nontender  Neurology: A&Ox3, nonfocal  Respiratory: CTA B/L, No W/R/R  CV: irreg rate and rhythm   Abdominal: Soft, NT, ND +BSx4  Extremities: No C/C/E, +2 peripheral pulses present  MSK: no joint erythema or warmth, no joint swelling   Skin: warm, dry

## 2019-02-19 NOTE — PROGRESS NOTE ADULT - PROBLEM SELECTOR PLAN 5
Chronic, rate controlled, INR supratherapeutic    - continue digoxin , low level today   - hold warfarin  - continue metoprolol   - remote tele
Likely prerenal, resolved now.  Hyperkalemia on presentation,  resolved now.  s/p Kayexalate one dose given  - monitor BMP  - encourage PO fluid intake
Likely prerenal, resolved now.  Hyperkalemia on presentation,  resolved now.  s/p Kayexalate one dose given  - d/c IVF, monitor BMP  - encourage PO fluid intake

## 2019-02-19 NOTE — PROGRESS NOTE ADULT - PROBLEM SELECTOR PLAN 3
With generalized weakness, chronic, likely exacerbated in setting of uti and dehydration  - Ambulate/OOB with assistance  - fall protocol   - PT eval
With generalized weakness, chronic, likely exacerbated in setting of uti and dehydration  - PT recs: FERNY  - Ambulate/OOB with assistance  - fall protocol
resolved
With generalized weakness, chronic, likely exacerbated in setting of uti and dehydration  - Ambulate/OOB with assistance  - fall protocol   - PT eval

## 2019-02-19 NOTE — CONSULT NOTE ADULT - PROBLEM SELECTOR RECOMMENDATION 9
hgb declined from baseline but now stable  occult negative  no gi objection to resume a/c  diet as tolerated  will follow
Not clear with no localizing symptoms but reasonable to treat empirically with ceftriaxone pending clarification. Even is micro is positive it will remain unclear if uti was the precipitating event

## 2019-02-19 NOTE — CONSULT NOTE ADULT - SUBJECTIVE AND OBJECTIVE BOX
[facility]  Judd Chávez PA-C  [address] [place] [address]  [phone]      Chief Complaint:  Patient is a 89y old  Male who presents with a chief complaint of s/p mechanical fall/inability to ambulate (19 Feb 2019 09:34)      HPI: 89 yo m pmx hx of Afib on coumadin, HTN, HLD, BPH, neuropathy presents to the ED s/p mechanical fall.  Pt reports that in the morning while trying to get out of bed, he felt generalized weakness and was unable to prop himself up with his legs/arms and fell off the side of his bed, he hit his head but denies LOC.  Hx of frequent falls over the last 6 months and inbalance for many years.  Reports new right thigh pain that started 1 week ago, that is tender to palpation. Denies cp, sob, abdominal pain, dysuria, headaches, changes in vision  asked to evaluate for anemia, hemoglobin dropped from admission but having brown stool,   hgb since has been stable  occult negative     Allergies:  No Known Allergies      Medications:  acetaminophen   Tablet .. 650 milliGRAM(s) Oral every 6 hours PRN  alfuzosin 10 milliGRAM(s) Oral at bedtime  artificial  tears Solution 1 Drop(s) Both EYES two times a day  colchicine 0.6 milliGRAM(s) Oral daily PRN  digoxin     Tablet 0.125 milliGRAM(s) Oral daily  finasteride 5 milliGRAM(s) Oral daily  gabapentin 300 milliGRAM(s) Oral two times a day  metoprolol tartrate 25 milliGRAM(s) Oral two times a day  multivitamin 1 Tablet(s) Oral daily  potassium phosphate IVPB 15 milliMole(s) IV Intermittent once      PMHX/PSHX:  Afib  Hypertension  Hyperlipidemia  Status post cholecystectomy      Family history:  No pertinent family history in first degree relatives      Social History:     ROS:     General:  No wt loss, fevers, chills, night sweats, fatigue,   Eyes:  Good vision, no reported pain  ENT:  No sore throat, pain, runny nose, dysphagia  CV:  No pain, palpitations, hypo/hypertension  Resp:  No dyspnea, cough, tachypnea, wheezing  GI:  No pain, No nausea, No vomiting, No diarrhea, No constipation, No weight loss, No fever, No pruritis, No rectal bleeding, No tarry stools, No dysphagia,  :  No pain, bleeding, incontinence, nocturia  Muscle:  No pain, weakness  Neuro:  No weakness, tingling, memory problems  Psych:  No fatigue, insomnia, mood problems, depression  Endocrine:  No polyuria, polydipsia, cold/heat intolerance  Heme:  No petechiae, ecchymosis, easy bruisability  Skin:  No rash, tattoos, scars, edema      PHYSICAL EXAM:   Vital Signs:  Vital Signs Last 24 Hrs  T(C): 36.7 (19 Feb 2019 05:00), Max: 37.1 (18 Feb 2019 21:07)  T(F): 98.1 (19 Feb 2019 05:00), Max: 98.7 (18 Feb 2019 21:07)  HR: 80 (19 Feb 2019 05:00) (69 - 91)  BP: 109/70 (19 Feb 2019 05:00) (109/67 - 131/77)  BP(mean): --  RR: 17 (19 Feb 2019 05:00) (16 - 18)  SpO2: 97% (19 Feb 2019 05:00) (95% - 98%)  Daily     Daily     GENERAL:  Appears stated age, well-groomed, well-nourished, no distress  HEENT:  NC/AT,  conjunctivae clear and pink, no thyromegaly, nodules, adenopathy, no JVD, sclera -anicteric  CHEST:  Full & symmetric excursion, no increased effort, breath sounds clear  HEART:  Regular rhythm, S1, S2, no murmur/rub/S3/S4, no abdominal bruit, no edema  ABDOMEN:  Soft, non-tender, non-distended, normoactive bowel sounds,  no masses ,no hepato-splenomegaly, no signs of chronic liver disease  EXTEREMITIES:  no cyanosis,clubbing or edema  SKIN:  No rash/erythema/ecchymoses/petechiae/wounds/abscess/warm/dry  NEURO:  Alert, oriented, no asterixis, no tremor, no encephalopathy    LABS:                        10.7   9.81  )-----------( 109      ( 19 Feb 2019 06:54 )             32.1     02-19    146<H>  |  108  |  17  ----------------------------<  95  4.7   |  30  |  0.96    Ca    8.1<L>      19 Feb 2019 06:54  Phos  2.4     02-19  Mg     1.9     02-19        PT/INR - ( 19 Feb 2019 06:54 )   PT: 18.6 sec;   INR: 1.61 ratio                 Imaging:

## 2019-02-19 NOTE — CHART NOTE - NSCHARTNOTEFT_GEN_A_CORE
Called by RN for 3.28 second pause. Patient sleeping comfortably and is asymptomatic. EKG obtained. EKG reads A-fib (VR 75 bpm), and only marginal different from EKG on admission. No ST elevation or depression. No T wave changes. Official read pending. Given that patient asymptomatic with benign appearing EKG, will abstain from contacting cardio at this moment and allow them to follow up this a.m. Will replete phosphorus via potassium phosphorus 15 mEq solution. Senior resident aware.  -Will monitor for any changes   -RN to notify if any changes

## 2019-02-19 NOTE — PHARMACOTHERAPY INTERVENTION NOTE - COMMENTS
Patient on warfarin 5mg daily prior to admission, but came in supratherapeutic (INR 3.85). Spoke with physician to recommend giving patient lower dose of warfarin tonight (2.5mg), due to patient age (89yo) as supratherapeutic INR upon admission.

## 2019-02-19 NOTE — PROGRESS NOTE ADULT - PROBLEM SELECTOR PLAN 8
Stable continue colchicine prn
With chronic low back pain, continue gabapentin  PT and ambulation
With chronic low back pain, continue gabapentin  PT eval and ambulation

## 2019-02-19 NOTE — DISCHARGE NOTE ADULT - PROVIDER TOKENS
FREE:[LAST:[Helga],FIRST:[Matthew],PHONE:[phone]:[(   )    -],ADDRESS:[[address] [place] [address]]],FREE:[LAST:[Marce],FIRST:[Michael],PHONE:[phone]:[(   )    -],ADDRESS:[[address] [place] [address]]]

## 2019-02-19 NOTE — PROGRESS NOTE ADULT - PROBLEM SELECTOR PLAN 6
Chronic, rate controlled, INR therapeutic    - continue digoxin and lopressor 25mg BID  - will give warfarin 2.5mg today as INR  1.6  - remote tele
Hypotensive on presentation, improved s/p ns bolus  - continue metoprolol, alfuzosin  - monitor hemodynamics
Chronic, rate controlled, INR therapeutic    - continue digoxin and BB  - hold warfarin for anemia and is on antibx  - continue metoprolol   - remote tele

## 2019-02-19 NOTE — PROGRESS NOTE ADULT - PROBLEM SELECTOR PLAN 4
Likely prerenal, resolved now.  Hyperkalemia on presentation,  resolved now.  s/p Kayexalate one dose given  - d/c IVF, monitor BMP  - encourage PO fluid intake
On admission he has HB of 14.8 gm. dropped to 10.1 possibly 2/2 dilution  - FOBT negative, H/H has been stable  - As per GI () can resume anticoag, unlikely has GIB
as per cardiology
On admission he has HB of 14.8 gm. dropped to 10.1  He did receive boluses IVF. to cause hemodilution.  He is also on coumadin with high INR. Will r/o GIB.  FOBT, iron profile. GI consult.

## 2019-02-19 NOTE — PROGRESS NOTE ADULT - SUBJECTIVE AND OBJECTIVE BOX
Resident Progress Note    Patient is a 89y old  Male who presents with a chief complaint of s/p mechanical fall/inability to ambulate (19 Feb 2019 12:51)      HPI: Patient seen and examined at bedside. Overnight, noted to have non-sustained RVR up to 180's during activity and up to 140's this am during ambulation.  Also had one 2 episodes of pauses, 2.2 sec followed by 3.28 sec overnight. ABx stopped yesterday. Patient currently denies fevers, chills, n/v, CP, palpitations, SOB, dysuria, hematuria, abdominal pain.    .    ROS:  Constitutional: denies fever, chills, diaphoresis   Respiratory: denies SOB, SNOWDEN, cough  Cardiovascular: denies chest pain, palpitations, edema  Gastrointestinal: denies nausea, vomiting, diarrhea, constipation, abdominal pain   Genitourinary: denies dysuria, frequency, urgency, hematuria   Musculoskeletal: denies myalgias, joint swelling, muscle weakness  Neurologic: denies headache  ROS negative except as noted above    Vital Signs Last 24 Hrs  T(C): 36.6 (02-19-19 @ 14:20), Max: 37.1 (02-18-19 @ 21:07)  T(F): 97.9 (02-19-19 @ 14:20), Max: 98.7 (02-18-19 @ 21:07)  HR: 97 (02-19-19 @ 14:20) (69 - 97)  BP: 104/67 (02-19-19 @ 14:20) (104/67 - 131/77)  BP(mean): --  RR: 18 (02-19-19 @ 14:20) (16 - 18)  SpO2: 97% (02-19-19 @ 14:20) (95% - 97%)    Physical Exam:  General: NAD  HEENT: NCAT,  EOMI bl, moist mucous membranes   Neck: Supple, nontender  Neurology: A&Ox3, nonfocal  Respiratory: CTA B/L, No W/R/R  CV: irreg rate and rhythm   Abdominal: Soft, NT, ND +BSx4  Extremities: No C/C/E, +2 peripheral pulses present  MSK: Normal ROM, no joint erythema or warmth, no joint swelling   Skin: warm, dry    LABS:                        10.7   9.81  )-----------( 109      ( 19 Feb 2019 06:54 )             32.1     19 Feb 2019 06:54    146    |  108    |  17     ----------------------------<  95     4.7     |  30     |  0.96     Ca    8.1        19 Feb 2019 06:54  Phos  2.4       19 Feb 2019 06:54  Mg     1.9       19 Feb 2019 06:54      PT/INR - ( 19 Feb 2019 06:54 )   PT: 18.6 sec;   INR: 1.61 ratio                 CAPILLARY BLOOD GLUCOSE            RADIOLOGY & ADDITIONAL TESTS:    MEDICATIONS  (STANDING):  alfuzosin 10 milliGRAM(s) Oral at bedtime  artificial  tears Solution 1 Drop(s) Both EYES two times a day  digoxin     Tablet 0.125 milliGRAM(s) Oral daily  finasteride 5 milliGRAM(s) Oral daily  gabapentin 300 milliGRAM(s) Oral two times a day  metoprolol tartrate 25 milliGRAM(s) Oral two times a day  multivitamin 1 Tablet(s) Oral daily  warfarin 2.5 milliGRAM(s) Oral once    MEDICATIONS  (PRN):  acetaminophen   Tablet .. 650 milliGRAM(s) Oral every 6 hours PRN Mild Pain (1 - 3)  colchicine 0.6 milliGRAM(s) Oral daily PRN gout symptoms      Allergies    No Known Allergies    Intolerances

## 2019-02-19 NOTE — PROGRESS NOTE ADULT - ASSESSMENT
· Assessment 		  89 y/o male with past medical history of A fib on AC (coumadin), HTN, HLD presented to the ER with fall while he is trying to get OOB. He also reports history of frequent falls over the last 6 months.    He has been following up with Dr. Zheng, cardiologist ([place]). He has been on BB, digoxin and  Coumadin     - Remains in Afib, however, non-sustained RVR up to 180's overnight during activity and up to 140's this am during ambulation.  Also had one 2 episodes of pauses, 2.2 sec followed by 3.28 sec overnight.  No further.  - Continue to monitor on tele  - C/W digoxin and lopressor 25 mg po bid for rate-control.  Dig level on 2/16=0.5.  - Dose Coumadin daily to keep INR 2-3.  Today's level=1.61.  Monitor for s/s bleeding  - No sign of acute ischemia. Troponin is negative.   - Monitor and replete lytes, keep K>4, Mg>2.   - Increase activity as tolerated with assistance  - Fall precaution  - Will follow with you    Jocelyn Levy NP  Cardiology

## 2019-02-19 NOTE — DISCHARGE NOTE ADULT - ADDITIONAL INSTRUCTIONS
-Please follow up with your primary doctor within one week.  -Please follow up with cardiologist outpatient (information below).  -Patient and family to set up follow up appointments.  -Continue taking your medications as directed above.  -If symptoms persist/worsen, please call your PMD or return to the ED.

## 2019-02-19 NOTE — DISCHARGE NOTE ADULT - CARE PROVIDER_API CALL
Matthew Partida  [address] [place] [address]  Phone: [phone]  Fax: (   )    -  Follow Up Time:     Michael Zheng  [address] [place] [address]  Phone: [phone]  Fax: (   )    -  Follow Up Time:

## 2019-02-20 VITALS
RESPIRATION RATE: 16 BRPM | TEMPERATURE: 97 F | DIASTOLIC BLOOD PRESSURE: 86 MMHG | OXYGEN SATURATION: 95 % | WEIGHT: 155.21 LBS | SYSTOLIC BLOOD PRESSURE: 140 MMHG | HEART RATE: 97 BPM

## 2019-02-20 LAB
ANION GAP SERPL CALC-SCNC: 9 MMOL/L — SIGNIFICANT CHANGE UP (ref 5–17)
BUN SERPL-MCNC: 18 MG/DL — SIGNIFICANT CHANGE UP (ref 7–23)
CALCIUM SERPL-MCNC: 7.7 MG/DL — LOW (ref 8.5–10.1)
CHLORIDE SERPL-SCNC: 107 MMOL/L — SIGNIFICANT CHANGE UP (ref 96–108)
CO2 SERPL-SCNC: 29 MMOL/L — SIGNIFICANT CHANGE UP (ref 22–31)
CREAT SERPL-MCNC: 0.74 MG/DL — SIGNIFICANT CHANGE UP (ref 0.5–1.3)
GLUCOSE SERPL-MCNC: 99 MG/DL — SIGNIFICANT CHANGE UP (ref 70–99)
HCT VFR BLD CALC: 30.1 % — LOW (ref 39–50)
HGB BLD-MCNC: 9.9 G/DL — LOW (ref 13–17)
INR BLD: 1.25 RATIO — HIGH (ref 0.88–1.16)
MCHC RBC-ENTMCNC: 32.5 PG — SIGNIFICANT CHANGE UP (ref 27–34)
MCHC RBC-ENTMCNC: 32.9 GM/DL — SIGNIFICANT CHANGE UP (ref 32–36)
MCV RBC AUTO: 98.7 FL — SIGNIFICANT CHANGE UP (ref 80–100)
NRBC # BLD: 0 /100 WBCS — SIGNIFICANT CHANGE UP (ref 0–0)
PLATELET # BLD AUTO: 116 K/UL — LOW (ref 150–400)
POTASSIUM SERPL-MCNC: 3.6 MMOL/L — SIGNIFICANT CHANGE UP (ref 3.5–5.3)
POTASSIUM SERPL-SCNC: 3.6 MMOL/L — SIGNIFICANT CHANGE UP (ref 3.5–5.3)
PROTHROM AB SERPL-ACNC: 14.2 SEC — HIGH (ref 10–12.9)
RBC # BLD: 3.05 M/UL — LOW (ref 4.2–5.8)
RBC # FLD: 14.7 % — HIGH (ref 10.3–14.5)
SODIUM SERPL-SCNC: 145 MMOL/L — SIGNIFICANT CHANGE UP (ref 135–145)
WBC # BLD: 8.55 K/UL — SIGNIFICANT CHANGE UP (ref 3.8–10.5)
WBC # FLD AUTO: 8.55 K/UL — SIGNIFICANT CHANGE UP (ref 3.8–10.5)

## 2019-02-20 PROCEDURE — 99232 SBSQ HOSP IP/OBS MODERATE 35: CPT

## 2019-02-20 PROCEDURE — 36415 COLL VENOUS BLD VENIPUNCTURE: CPT

## 2019-02-20 PROCEDURE — 73552 X-RAY EXAM OF FEMUR 2/>: CPT

## 2019-02-20 PROCEDURE — 97530 THERAPEUTIC ACTIVITIES: CPT

## 2019-02-20 PROCEDURE — 82272 OCCULT BLD FECES 1-3 TESTS: CPT

## 2019-02-20 PROCEDURE — 85610 PROTHROMBIN TIME: CPT

## 2019-02-20 PROCEDURE — 83540 ASSAY OF IRON: CPT

## 2019-02-20 PROCEDURE — 83735 ASSAY OF MAGNESIUM: CPT

## 2019-02-20 PROCEDURE — 71045 X-RAY EXAM CHEST 1 VIEW: CPT

## 2019-02-20 PROCEDURE — 84145 PROCALCITONIN (PCT): CPT

## 2019-02-20 PROCEDURE — 85730 THROMBOPLASTIN TIME PARTIAL: CPT

## 2019-02-20 PROCEDURE — 97110 THERAPEUTIC EXERCISES: CPT

## 2019-02-20 PROCEDURE — 80162 ASSAY OF DIGOXIN TOTAL: CPT

## 2019-02-20 PROCEDURE — 83605 ASSAY OF LACTIC ACID: CPT

## 2019-02-20 PROCEDURE — 76770 US EXAM ABDO BACK WALL COMP: CPT

## 2019-02-20 PROCEDURE — 87040 BLOOD CULTURE FOR BACTERIA: CPT

## 2019-02-20 PROCEDURE — 84484 ASSAY OF TROPONIN QUANT: CPT

## 2019-02-20 PROCEDURE — 72170 X-RAY EXAM OF PELVIS: CPT

## 2019-02-20 PROCEDURE — 97162 PT EVAL MOD COMPLEX 30 MIN: CPT

## 2019-02-20 PROCEDURE — 80048 BASIC METABOLIC PNL TOTAL CA: CPT

## 2019-02-20 PROCEDURE — 99285 EMERGENCY DEPT VISIT HI MDM: CPT | Mod: 25

## 2019-02-20 PROCEDURE — 87086 URINE CULTURE/COLONY COUNT: CPT

## 2019-02-20 PROCEDURE — 99239 HOSP IP/OBS DSCHRG MGMT >30: CPT

## 2019-02-20 PROCEDURE — 97116 GAIT TRAINING THERAPY: CPT

## 2019-02-20 PROCEDURE — 76775 US EXAM ABDO BACK WALL LIM: CPT

## 2019-02-20 PROCEDURE — 72125 CT NECK SPINE W/O DYE: CPT

## 2019-02-20 PROCEDURE — 83550 IRON BINDING TEST: CPT

## 2019-02-20 PROCEDURE — 82550 ASSAY OF CK (CPK): CPT

## 2019-02-20 PROCEDURE — 81001 URINALYSIS AUTO W/SCOPE: CPT

## 2019-02-20 PROCEDURE — 70450 CT HEAD/BRAIN W/O DYE: CPT

## 2019-02-20 PROCEDURE — 84100 ASSAY OF PHOSPHORUS: CPT

## 2019-02-20 PROCEDURE — 80053 COMPREHEN METABOLIC PANEL: CPT

## 2019-02-20 PROCEDURE — 87631 RESP VIRUS 3-5 TARGETS: CPT

## 2019-02-20 PROCEDURE — 85027 COMPLETE CBC AUTOMATED: CPT

## 2019-02-20 PROCEDURE — 82728 ASSAY OF FERRITIN: CPT

## 2019-02-20 PROCEDURE — 73502 X-RAY EXAM HIP UNI 2-3 VIEWS: CPT

## 2019-02-20 RX ORDER — METOPROLOL TARTRATE 50 MG
1 TABLET ORAL
Qty: 0 | Refills: 0 | COMMUNITY
Start: 2019-02-20

## 2019-02-20 RX ORDER — DIGOXIN 250 MCG
1 TABLET ORAL
Qty: 0 | Refills: 0 | COMMUNITY

## 2019-02-20 RX ORDER — ACETAMINOPHEN 500 MG
2 TABLET ORAL
Qty: 0 | Refills: 0 | COMMUNITY
Start: 2019-02-20

## 2019-02-20 RX ORDER — METOPROLOL TARTRATE 50 MG
1 TABLET ORAL
Qty: 0 | Refills: 0 | COMMUNITY

## 2019-02-20 RX ORDER — PANTOPRAZOLE SODIUM 20 MG/1
40 TABLET, DELAYED RELEASE ORAL
Qty: 0 | Refills: 0 | Status: DISCONTINUED | OUTPATIENT
Start: 2019-02-20 | End: 2019-02-20

## 2019-02-20 RX ORDER — DIGOXIN 250 MCG
1 TABLET ORAL
Qty: 0 | Refills: 0 | COMMUNITY
Start: 2019-02-20

## 2019-02-20 RX ADMIN — GABAPENTIN 300 MILLIGRAM(S): 400 CAPSULE ORAL at 05:34

## 2019-02-20 RX ADMIN — Medication 25 MILLIGRAM(S): at 05:34

## 2019-02-20 RX ADMIN — Medication 0.12 MILLIGRAM(S): at 05:34

## 2019-02-20 RX ADMIN — Medication 1 TABLET(S): at 11:56

## 2019-02-20 RX ADMIN — FINASTERIDE 5 MILLIGRAM(S): 5 TABLET, FILM COATED ORAL at 11:56

## 2019-02-20 RX ADMIN — Medication 1 DROP(S): at 05:34

## 2019-02-20 NOTE — PROGRESS NOTE ADULT - SUBJECTIVE AND OBJECTIVE BOX
[facility] -- Steven Damon, Jammie, Ed, Swapnil Spann Savella  Office # [phone]    Follow Up:  Afib s/p mechanical fall    Subjective/Observations: Sitting on the chair, on room air.  Denies respiratory or cardiac discomfort.  Admits to be ambulating back and forth in the hallway with no symptoms.  Denies any form bleeding    REVIEW OF SYSTEMS: All other review of systems is negative unless indicated above    PAST MEDICAL & SURGICAL HISTORY:  Afib  Hypertension  Hyperlipidemia  Status post cholecystectomy    MEDICATIONS  (STANDING):  alfuzosin 10 milliGRAM(s) Oral at bedtime  artificial  tears Solution 1 Drop(s) Both EYES two times a day  digoxin     Tablet 0.125 milliGRAM(s) Oral daily  finasteride 5 milliGRAM(s) Oral daily  gabapentin 300 milliGRAM(s) Oral two times a day  metoprolol tartrate 25 milliGRAM(s) Oral two times a day  multivitamin 1 Tablet(s) Oral daily  pantoprazole    Tablet 40 milliGRAM(s) Oral before breakfast    MEDICATIONS  (PRN):  acetaminophen   Tablet .. 650 milliGRAM(s) Oral every 6 hours PRN Mild Pain (1 - 3)  colchicine 0.6 milliGRAM(s) Oral daily PRN gout symptoms    Allergies    No Known Allergies    Intolerances    Vital Signs Last 24 Hrs  T(C): 36.3 (20 Feb 2019 04:44), Max: 36.9 (19 Feb 2019 20:26)  T(F): 97.4 (20 Feb 2019 04:44), Max: 98.4 (19 Feb 2019 20:26)  HR: 97 (20 Feb 2019 04:44) (73 - 97)  BP: 140/86 (20 Feb 2019 04:44) (104/67 - 140/86)  BP(mean): --  RR: 16 (20 Feb 2019 04:44) (16 - 18)  SpO2: 95% (20 Feb 2019 04:44) (95% - 98%)    I&O's Summary    19 Feb 2019 07:01  -  20 Feb 2019 07:00  --------------------------------------------------------  IN: 610 mL / OUT: 0 mL / NET: 610 mL    PHYSICAL EXAM:  TELE: Not on tele  Constitutional: NAD, awake and alert, well-developed  HEENT: Moist Mucous Membranes, Anicteric  Pulmonary: Non-labored, breath sounds are clear bilaterally, No wheezing, rales or rhonchi  Cardiovascular: Irregularly irregular, S1 and S2, No murmurs, rubs, gallops or clicks  Gastrointestinal: Bowel Sounds present, soft, nontender.   Lymph: No peripheral edema. No lymphadenopathy.  Skin: No visible rashes or ulcers.  Psych:  Mood & affect appropriate    LABS: All Labs Reviewed:                        9.9    8.55  )-----------( 116      ( 20 Feb 2019 08:36 )             30.1                         10.7   9.81  )-----------( 109      ( 19 Feb 2019 06:54 )             32.1                         10.1   9.90  )-----------( 100      ( 18 Feb 2019 09:13 )             30.3     20 Feb 2019 08:36    145    |  107    |  18     ----------------------------<  99     3.6     |  29     |  0.74   19 Feb 2019 06:54    146    |  108    |  17     ----------------------------<  95     4.7     |  30     |  0.96   18 Feb 2019 09:13    146    |  110    |  16     ----------------------------<  94     3.5     |  28     |  0.91     Ca    7.7        20 Feb 2019 08:36  Ca    8.1        19 Feb 2019 06:54  Ca    7.6        18 Feb 2019 09:13  Phos  2.4       19 Feb 2019 06:54  Mg     1.9       19 Feb 2019 06:54    PT/INR - ( 20 Feb 2019 08:36 )   PT: 14.2 sec;   INR: 1.25 ratio      < from: Xray Chest 1 View AP/PA (02.16.19 @ 13:37) >    EXAM:  XR CHEST AP OR PA 1V                          PROCEDURE DATE:  02/16/2019      INTERPRETATION:  Chest, single portable view    HISTORY: Status post fall, weakness chest pain.    Comparison: None    IMPRESSION:    Support Devices: None  Heart: Cardiomegaly  Mediastinum:  Unremarkable  Lungs/Airways:  Unremarkable  Bones/Soft tissues: Unremarkable    JUAN GIFFORD M.D., ATTENDING RADIOLOGIST  This document has been electronically signed. Feb 16 2019  3:58PM    < end of copied text >     < from: CT Head No Cont (02.16.19 @ 14:19) >    EXAM:  CT BRAIN                          PROCEDURE DATE:  02/16/2019      INTERPRETATION:  CT BRAIN     Axial sections were obtained from base to vertex without contrast.    Clinical History: Fall head injury    Comparison: None    FINDINGS:    There is no mass, mass effect or midline shift. There are no intraaxial   or extraaxial fluid collections. There is no evidence for acute segmental   infarct.  Ventricular system and sulcal pattern are within normal limits   for the patient's age.    The visualized sinuses and mastoid air cells are unremarkable.    Bones and soft tissues are normal.     IMPRESSION: No acute findings.    JUAN GIFFORD M.D., ATTENDING RADIOLOGIST  This document has been electronically signed. Feb 16 2019  2:25PM     < end of copied text >

## 2019-02-20 NOTE — PROGRESS NOTE ADULT - SUBJECTIVE AND OBJECTIVE BOX
INTERVAL HPI/OVERNIGHT EVENTS:  pt seen and examined  denies n/v/abd pain  reports bm yesterday  per overnight rn no s/s active gib  afebrile overnight labs noted    MEDICATIONS  (STANDING):  alfuzosin 10 milliGRAM(s) Oral at bedtime  artificial  tears Solution 1 Drop(s) Both EYES two times a day  digoxin     Tablet 0.125 milliGRAM(s) Oral daily  finasteride 5 milliGRAM(s) Oral daily  gabapentin 300 milliGRAM(s) Oral two times a day  metoprolol tartrate 25 milliGRAM(s) Oral two times a day  multivitamin 1 Tablet(s) Oral daily    MEDICATIONS  (PRN):  acetaminophen   Tablet .. 650 milliGRAM(s) Oral every 6 hours PRN Mild Pain (1 - 3)  colchicine 0.6 milliGRAM(s) Oral daily PRN gout symptoms      Allergies    No Known Allergies    Intolerances        Review of Systems:    General:  No wt loss, fevers, chills, night sweats, fatigue   Eyes:  Good vision, no reported pain  ENT:  No sore throat, pain, runny nose, dysphagia  CV:  No pain, palpitations, hypo/hypertension  Resp:  No dyspnea, cough, tachypnea, wheezing  GI:  No pain, No nausea, No vomiting, No diarrhea, No constipation, No weight loss, No fever, No pruritis, No rectal bleeding, No melena, No dysphagia  :  No pain, bleeding, incontinence, nocturia  Muscle:  No pain, weakness  Neuro:  No weakness, tingling, memory problems  Psych:  No fatigue, insomnia, mood problems, depression  Endocrine:  No polyuria, polydypsia, cold/heat intolerance  Heme:  No petechiae, ecchymosis, easy bruisability  Skin:  No rash, tattoos, scars, edema      Vital Signs Last 24 Hrs  T(C): 36.3 (20 Feb 2019 04:44), Max: 36.9 (19 Feb 2019 20:26)  T(F): 97.4 (20 Feb 2019 04:44), Max: 98.4 (19 Feb 2019 20:26)  HR: 97 (20 Feb 2019 04:44) (73 - 97)  BP: 140/86 (20 Feb 2019 04:44) (104/67 - 140/86)  BP(mean): --  RR: 16 (20 Feb 2019 04:44) (16 - 18)  SpO2: 95% (20 Feb 2019 04:44) (95% - 98%)    PHYSICAL EXAM:    Constitutional: NAD  HEENT: ncat  Neck: No LAD  Respiratory: dec bs  Cardiovascular: S1 and S2, RRR  Gastrointestinal: soft nt nd  Extremities: No peripheral edema  Vascular: 2+ peripheral pulses  Neurological: Awake alert responds appropriately  Skin: No rashes      LABS:                        9.9    8.55  )-----------( 116      ( 20 Feb 2019 08:36 )             30.1     02-20    145  |  107  |  18  ----------------------------<  99  3.6   |  29  |  0.74    Ca    7.7<L>      20 Feb 2019 08:36  Phos  2.4     02-19  Mg     1.9     02-19      PT/INR - ( 20 Feb 2019 08:36 )   PT: 14.2 sec;   INR: 1.25 ratio               RADIOLOGY & ADDITIONAL TESTS:

## 2019-02-20 NOTE — PROGRESS NOTE ADULT - PROBLEM SELECTOR PLAN 2
care per cards
Doubt  Stop abx
UA noted - plan as per above
UA noted - plan as per above  asymptomatic
UA noted - plan as per above

## 2019-02-20 NOTE — PROGRESS NOTE ADULT - ATTENDING COMMENTS
Chart reviewed    Patient seen and examined    Agree with plan as outlined above    Discussed heart rate elevations extensively with team and will continue to make adjustments in medication. Situation remains somewhat difficult
Seen/examined. agree with above.
The patient was personally seen and examined, in addition to being examined and evaluated by NP.  All elements of the note were edited where appropriate.
Seen/examined. agree with above
Advanced care planning was discussed with patient and family.  Advanced care planning forms were reviewed and discussed.  Risks, benefits and alternatives of gastroenterologic procedures were discussed in detail and all questions were answered.    30 minutes spent.
Thank you for the courtesy of this referral.    I'll sign off at this time.     Gael Ramos MD  [phone]
On admission he has HB of 14.8 gm. dropped to 10.1 possibly 2/2 dilution  - FOBT negative, H/H has been stable  - As per GI () can resume anticoag, unlikely has GIB

## 2019-02-20 NOTE — PROGRESS NOTE ADULT - REASON FOR ADMISSION
s/p mechanical fall/inability to ambulate

## 2019-02-20 NOTE — PROGRESS NOTE ADULT - PROBLEM SELECTOR PROBLEM 2
Afib
UTI (urinary tract infection)

## 2019-02-20 NOTE — PROGRESS NOTE ADULT - PROBLEM SELECTOR PLAN 1
hgb trend noted  no evidence of active gib  occult negative  monitor cbc closely, transfuse prn   ppi ppx  no gi objection to a/c  diet as tolerated  will follow
- possible sec to  2/2 uti from  with component of dehydration. Leukocytosis with elevated lactate and hypotensive on presentation. UA  +  - lactate normal, bp improved.  - continue  iv abx Rocephin  for now. check procal  - encourage PO fluids, eating well.  -  fu blood and urine cultures
- sec to  2/2 uti   - currently afebrile, no leukocytosis or urinary symptoms  - Ucx significant for 50-99K Enterococcous, patient is s/p rocephin x2days  - bcx NGTD   - lactate normal, bp stable  - encourage PO fluids, eating well.
- sec to  2/2 uti . UA  + and urine cx is pending  - lactate normal, bp improved. procal 0.11  - continue  iv abx Rocephin  for now.   - encourage PO fluids, eating well.  -  fu blood and urine cultures
as per primary team  rehab

## 2019-02-20 NOTE — PROGRESS NOTE ADULT - ASSESSMENT
89 y/o male with past medical history of A fib on AC (coumadin), HTN, HLD presented to the ER with fall while he is trying to get OOB. He also reports history of frequent falls over the last 6 months.    He has been following up with Dr. Zheng, cardiologist ([facility]). He has been on BB, digoxin and  Coumadin     - No tele events.  Remains in Afib with rate-controlled  - Dose Coumadin daily to keep INR 2-3.  INR =1.25  - No sign of acute ischemia or volume overload  - C/W digoxin and lopressor 25 mg po bid for rate-control  - Monitor and replete lytes, keep K>4, Mg>2.   - Awaiting discharge.  Will follow up with his private Cardio, Dr. Marce Levy NP  Cardiology

## 2019-02-21 LAB
CULTURE RESULTS: SIGNIFICANT CHANGE UP
SPECIMEN SOURCE: SIGNIFICANT CHANGE UP

## 2019-03-12 NOTE — PHYSICAL THERAPY INITIAL EVALUATION ADULT - NS ASR RISK AREAS PT EVAL
CM received call from Ravinder at [facility] and requesting updated PT and OT notes   Ravinder looking for notes showing progression in PT   Ravinder informed cm that they are not willing to accept on a elo lift   Cm notified PT and OT via tiger text to see after HD today   Cm will follow  fall

## 2022-10-11 ENCOUNTER — TRANSCRIPTION ENCOUNTER (OUTPATIENT)
Age: 87
End: 2022-10-11

## 2022-11-09 ENCOUNTER — TRANSCRIPTION ENCOUNTER (OUTPATIENT)
Age: 87
End: 2022-11-09
